# Patient Record
Sex: MALE | Race: WHITE
[De-identification: names, ages, dates, MRNs, and addresses within clinical notes are randomized per-mention and may not be internally consistent; named-entity substitution may affect disease eponyms.]

---

## 2017-04-24 ENCOUNTER — HOSPITAL ENCOUNTER (OUTPATIENT)
Dept: HOSPITAL 45 - C.LABPBG | Age: 72
Discharge: HOME | End: 2017-04-24
Attending: INTERNAL MEDICINE
Payer: COMMERCIAL

## 2017-04-24 DIAGNOSIS — E83.52: ICD-10-CM

## 2017-04-24 DIAGNOSIS — E11.9: Primary | ICD-10-CM

## 2017-04-24 LAB — EST. AVERAGE GLUCOSE BLD GHB EST-MCNC: 197 MG/DL

## 2017-04-28 ENCOUNTER — HOSPITAL ENCOUNTER (OUTPATIENT)
Dept: HOSPITAL 45 - C.RADBC | Age: 72
Discharge: HOME | End: 2017-04-28
Attending: PHYSICIAN ASSISTANT
Payer: COMMERCIAL

## 2017-04-28 DIAGNOSIS — M25.551: Primary | ICD-10-CM

## 2017-04-28 NOTE — DIAGNOSTIC IMAGING REPORT
SI JOINTS 3 OR MORE VIEWS



CLINICAL HISTORY: Right hip and lower back pain    



COMPARISON STUDY:  No previous studies for comparison.



FINDINGS: No fractures are visualized. There is no SI joint fusion. There are no

erosive changes.



IMPRESSION:  Age-related change. No evidence of fracture. No evidence of erosive

disease. 









Electronically signed by:  Zane Mejia M.D.

4/28/2017 12:55 PM



Dictated Date/Time:  4/28/2017 12:54 PM

## 2017-04-28 NOTE — DIAGNOSTIC IMAGING REPORT
RIGHT HIP 2 VIEWS



HISTORY:      Hip pain, right

Right



COMPARISON: None.



FINDINGS: There is no fracture or dislocation. Soft tissues are unremarkable.

The visualized pelvic bones are intact. Vascular calcifications are noted. Mild

cartilage space narrowing and marginal osteophytes within the right hip. There

is also subchondral sclerosis within the superior aspect of the right hip.

Findings are consistent with degenerative change.



IMPRESSION:  

No fractures. Mild osteoarthritis within the right hip.







Electronically signed by:  Sukhwinder Linder M.D.

4/28/2017 12:54 PM



Dictated Date/Time:  4/28/2017 12:53 PM

## 2017-06-12 ENCOUNTER — HOSPITAL ENCOUNTER (OUTPATIENT)
Dept: HOSPITAL 45 - C.LABPBG | Age: 72
Discharge: HOME | End: 2017-06-12
Attending: INTERNAL MEDICINE
Payer: COMMERCIAL

## 2017-06-12 DIAGNOSIS — E55.9: ICD-10-CM

## 2017-06-12 DIAGNOSIS — E11.9: ICD-10-CM

## 2017-06-12 DIAGNOSIS — I10: Primary | ICD-10-CM

## 2017-06-12 DIAGNOSIS — M19.90: ICD-10-CM

## 2017-06-12 DIAGNOSIS — D64.9: ICD-10-CM

## 2017-06-12 DIAGNOSIS — E83.52: ICD-10-CM

## 2017-06-12 LAB
ANION GAP SERPL CALC-SCNC: 9 MMOL/L (ref 3–11)
BASOPHILS # BLD: 0.02 K/UL (ref 0–0.2)
BASOPHILS NFR BLD: 0.3 %
BUN SERPL-MCNC: 17 MG/DL (ref 7–18)
BUN/CREAT SERPL: 14.5 (ref 10–20)
CALCIUM SERPL-MCNC: 9.6 MG/DL (ref 8.5–10.1)
CHLORIDE SERPL-SCNC: 111 MMOL/L (ref 98–107)
CO2 SERPL-SCNC: 25 MMOL/L (ref 21–32)
COMPLETE: YES
CREAT SERPL-MCNC: 1.2 MG/DL (ref 0.6–1.4)
EOSINOPHIL NFR BLD AUTO: 163 K/UL (ref 130–400)
GLUCOSE SERPL-MCNC: 138 MG/DL (ref 70–99)
HCT VFR BLD CALC: 40.1 % (ref 42–52)
IG%: 0.2 %
IMM GRANULOCYTES NFR BLD AUTO: 39.9 %
LYMPHOCYTES # BLD: 2.59 K/UL (ref 1.2–3.4)
MCH RBC QN AUTO: 31.1 PG (ref 25–34)
MCHC RBC AUTO-ENTMCNC: 33.2 G/DL (ref 32–36)
MCV RBC AUTO: 93.9 FL (ref 80–100)
MONOCYTES NFR BLD: 7.7 %
NEUTROPHILS # BLD AUTO: 2.6 %
NEUTROPHILS NFR BLD AUTO: 49.3 %
PMV BLD AUTO: 10.6 FL (ref 7.4–10.4)
POTASSIUM SERPL-SCNC: 4.2 MMOL/L (ref 3.5–5.1)
RBC # BLD AUTO: 4.27 M/UL (ref 4.7–6.1)
SODIUM SERPL-SCNC: 145 MMOL/L (ref 136–145)
WBC # BLD AUTO: 6.49 K/UL (ref 4.8–10.8)

## 2017-06-13 LAB — EST. AVERAGE GLUCOSE BLD GHB EST-MCNC: 169 MG/DL

## 2017-10-09 ENCOUNTER — HOSPITAL ENCOUNTER (INPATIENT)
Dept: HOSPITAL 45 - C.EDB | Age: 72
LOS: 1 days | Discharge: HOME | DRG: 66 | End: 2017-10-10
Attending: HOSPITALIST | Admitting: HOSPITALIST
Payer: COMMERCIAL

## 2017-10-09 VITALS
HEART RATE: 57 BPM | OXYGEN SATURATION: 94 % | SYSTOLIC BLOOD PRESSURE: 174 MMHG | DIASTOLIC BLOOD PRESSURE: 83 MMHG | TEMPERATURE: 98.06 F

## 2017-10-09 VITALS
HEART RATE: 62 BPM | SYSTOLIC BLOOD PRESSURE: 147 MMHG | OXYGEN SATURATION: 95 % | DIASTOLIC BLOOD PRESSURE: 71 MMHG | TEMPERATURE: 97.88 F

## 2017-10-09 VITALS
WEIGHT: 196.21 LBS | HEIGHT: 69.02 IN | BODY MASS INDEX: 29.06 KG/M2 | WEIGHT: 196.21 LBS | BODY MASS INDEX: 29.06 KG/M2 | HEIGHT: 69.02 IN | BODY MASS INDEX: 29.06 KG/M2

## 2017-10-09 VITALS
HEART RATE: 61 BPM | SYSTOLIC BLOOD PRESSURE: 128 MMHG | TEMPERATURE: 97.7 F | DIASTOLIC BLOOD PRESSURE: 66 MMHG | OXYGEN SATURATION: 91 %

## 2017-10-09 VITALS — OXYGEN SATURATION: 99 %

## 2017-10-09 DIAGNOSIS — Z79.82: ICD-10-CM

## 2017-10-09 DIAGNOSIS — E83.42: ICD-10-CM

## 2017-10-09 DIAGNOSIS — F17.220: ICD-10-CM

## 2017-10-09 DIAGNOSIS — E78.5: ICD-10-CM

## 2017-10-09 DIAGNOSIS — I63.412: Primary | ICD-10-CM

## 2017-10-09 DIAGNOSIS — I10: ICD-10-CM

## 2017-10-09 DIAGNOSIS — E11.9: ICD-10-CM

## 2017-10-09 DIAGNOSIS — Z86.73: ICD-10-CM

## 2017-10-09 DIAGNOSIS — M19.90: ICD-10-CM

## 2017-10-09 DIAGNOSIS — Z96.651: ICD-10-CM

## 2017-10-09 LAB
ALP SERPL-CCNC: 86 U/L (ref 45–117)
ALT SERPL-CCNC: 31 U/L (ref 12–78)
ANION GAP SERPL CALC-SCNC: 9 MMOL/L (ref 3–11)
APPEARANCE UR: CLEAR
AST SERPL-CCNC: 34 U/L (ref 15–37)
BASOPHILS # BLD: 0.03 K/UL (ref 0–0.2)
BASOPHILS NFR BLD: 0.6 %
BILIRUB UR-MCNC: (no result) MG/DL
BUN SERPL-MCNC: 13 MG/DL (ref 7–18)
BUN/CREAT SERPL: 12.9 (ref 10–20)
CALCIUM SERPL-MCNC: 9.7 MG/DL (ref 8.5–10.1)
CHLORIDE SERPL-SCNC: 110 MMOL/L (ref 98–107)
CKMB/CK RATIO: 0.7 (ref 0–3)
CO2 SERPL-SCNC: 23 MMOL/L (ref 21–32)
COLOR UR: YELLOW
COMPLETE: YES
CREAT CL PREDICTED SERPL C-G-VRATE: 75.6 ML/MIN
CREAT SERPL-MCNC: 1 MG/DL (ref 0.6–1.4)
EOSINOPHIL NFR BLD AUTO: 156 K/UL (ref 130–400)
EST. AVERAGE GLUCOSE BLD GHB EST-MCNC: 240 MG/DL
GLUCOSE SERPL-MCNC: 194 MG/DL (ref 70–99)
HCT VFR BLD CALC: 41.7 % (ref 42–52)
IG%: 0.4 %
IMM GRANULOCYTES NFR BLD AUTO: 40 %
INR PPP: 1 (ref 0.9–1.1)
LYMPHOCYTES # BLD: 2.16 K/UL (ref 1.2–3.4)
MAGNESIUM SERPL-MCNC: 1.5 MG/DL (ref 1.8–2.4)
MANUAL MICROSCOPIC REQUIRED?: NO
MCH RBC QN AUTO: 30.5 PG (ref 25–34)
MCHC RBC AUTO-ENTMCNC: 33.1 G/DL (ref 32–36)
MCV RBC AUTO: 92.3 FL (ref 80–100)
MONOCYTES NFR BLD: 7.2 %
NEUTROPHILS # BLD AUTO: 2.4 %
NEUTROPHILS NFR BLD AUTO: 49.4 %
NITRITE UR QL STRIP: (no result)
PARTIAL THROMBOPLASTIN RATIO: 1
PH UR STRIP: 5 [PH] (ref 4.5–7.5)
PMV BLD AUTO: 10.3 FL (ref 7.4–10.4)
POTASSIUM SERPL-SCNC: 3.8 MMOL/L (ref 3.5–5.1)
PROTHROMBIN TIME: 10.7 SECONDS (ref 9–12)
RBC # BLD AUTO: 4.52 M/UL (ref 4.7–6.1)
REVIEW REQ?: YES
SODIUM SERPL-SCNC: 142 MMOL/L (ref 136–145)
SP GR UR STRIP: 1.03 (ref 1–1.03)
TSH SERPL-ACNC: 2.52 UIU/ML (ref 0.3–4.5)
URINE EPITHELIAL CELL AUTO: (no result) /LPF (ref 0–5)
UROBILINOGEN UR-MCNC: (no result) MG/DL
WBC # BLD AUTO: 5.4 K/UL (ref 4.8–10.8)
ZZUR CULT IF INDIC CLEAN CATCH: NO

## 2017-10-09 RX ADMIN — MAGNESIUM SULFATE IN DEXTROSE SCH MLS/HR: 10 INJECTION, SOLUTION INTRAVENOUS at 17:20

## 2017-10-09 RX ADMIN — MAGNESIUM SULFATE IN DEXTROSE SCH MLS/HR: 10 INJECTION, SOLUTION INTRAVENOUS at 16:11

## 2017-10-09 RX ADMIN — MAGNESIUM SULFATE IN DEXTROSE SCH MLS/HR: 10 INJECTION, SOLUTION INTRAVENOUS at 15:14

## 2017-10-09 RX ADMIN — INSULIN ASPART SCH UNITS: 100 INJECTION, SOLUTION INTRAVENOUS; SUBCUTANEOUS at 17:02

## 2017-10-09 RX ADMIN — INSULIN ASPART SCH UNITS: 100 INJECTION, SOLUTION INTRAVENOUS; SUBCUTANEOUS at 20:34

## 2017-10-09 RX ADMIN — INSULIN DETEMIR SCH UNITS: 100 INJECTION, SOLUTION SUBCUTANEOUS at 20:35

## 2017-10-09 RX ADMIN — INSULIN ASPART SCH UNITS: 100 INJECTION, SOLUTION INTRAVENOUS; SUBCUTANEOUS at 11:00

## 2017-10-09 NOTE — DIAGNOSTIC IMAGING REPORT
SINGLE VIEW CHEST



CLINICAL HISTORY:  Weakness. Change in mental status.



FINDINGS: An AP, portable, upright chest radiograph is compared to study dated

11/12/2015. The cardiomediastinal silhouette is unremarkable. There is

atherosclerotic calcification of the thoracic aorta. There is mild elevation of

left hemidiaphragm with left basilar atelectasis. No airspace consolidation is

seen typical for pneumonia and there is no large pleural effusion. No

pneumothorax is seen. The bony thorax is grossly intact.



IMPRESSION: No acute cardiopulmonary abnormality.







Electronically signed by:  Jason Clemons M.D.

10/9/2017 8:22 AM



Dictated Date/Time:  10/9/2017 8:21 AM

## 2017-10-09 NOTE — DIAGNOSTIC IMAGING REPORT
MRI OF THE BRAIN WITHOUT  CONTRAST



CLINICAL HISTORY: Transient ischemic attack. RIGHT ARM APRAXIA.



COMPARISON STUDY: Noncontrast head CT dated 10/9/2017



FINDINGS:

Sagittal T1, axial diffusion, proton density and T2 weighted axial, coronal

FLAIR, and axial T1-weighted images were acquired. 

No intra or extra-axial mass lesions are visualized

There are very small foci of restricted water diffusion within the left frontal

lobe, parietal lobe, and temporal lobes. The findings are consistent with small

foci of acute/subacute infarction.

There is no evidence of ventricular dilatation.

Proton density T2-weighted and FLAIR images reveal scattered foci of increased

T2 signal within the white matter, likely on a small vessel basis. There is an

old left cerebellar infarct. There are atrophic changes with prominent

subarachnoid space.

There are no abnormal flow voids.



IMPRESSION:  Scattered very small foci of restricted water diffusion within the

distribution of left middle cerebral artery, consistent with foci of

acute/subacute infarction







Electronically signed by:  Zane Mejia M.D.

10/9/2017 1:37 PM



Dictated Date/Time:  10/9/2017 1:33 PM

## 2017-10-09 NOTE — HISTORY AND PHYSICAL
History & Physical


Date & Time of Service:


Oct 9, 2017 at 09:09


Chief Complaint:


No Movement In Right Hand


Primary Care Physician:


Vishnu Hastings M.D.


History of Present Illness


Mr. Neumann noticed at 5:30 this morning that he was experiencing right hand 

weakness when using his remote control. He also had associated right facial 

droop.  He did not have any aphasia or slurring of speech or other symptoms and 

his wife did not notice that he was different. He took a baby aspirin at home. 

Symptoms resolved after getting to the emergency department at about 0800.  He 

has never had a stroke or TIA in the past and has no cardiac history.





Hx  DMII with insulin, chews tobacco x 60 years, htn, hyperlipidemia, siatica 

with 3 injections in the past 2 months, right knee arthroplasty , Baker's 

cyst right leg.





Past Medical/Surgical History


Medical Problems:


(1) Diabetes


Status: Chronic  





(2) HTN (hypertension)


Status: Chronic  





(3) Hyperlipemia


Status: Chronic  





Surgical Problems:


(1) H/O: knee surgery


Status: Resolved  











Family History





Patient reports no known family medical history.


Mother  of Alzheimers


Father  of old age


Brother with no significant medical history


Two daughters and a son - one daughter and one son are diabetic Type II


Granddaughter with Type I diabetes.





Social History


Smoking Status:  Never Smoker


Smokeless Tobacco Use:  Yes


Alcohol Use:  socially


Drug Use:  none


Marital Status:  


Housing status:  lives with significant other


Occupational Status:  retired





Immunizations


History of Influenza Vaccine:  Yes


Influenza Vaccine Date:  Oct 18, 2006


History of Tetanus Vaccine?:  Yes


History of Pneumococcal:  Yes


Pneumococcal Date:  Oct 18, 2006


History of Hepatitis B Vaccine:  No





Multi-Drug Resistant Organisms


History of MDRO:  No





Allergies


Coded Allergies:  


     No Known Allergies (Verified , 10/9/17)





Home Medications


Scheduled


Amlodipine (Norvasc), 5 MG PO DAILY


Aspirin (Aspirin Ec), 81 MG PO DAILY


Atorvastatin (Lipitor), 40 MG PO DAILY


Cholecalciferol (Vitamin D-3), 2,000 UNITS PO DAILY


Cyanocobalamin (Vitamin B-12), 1,000 MCG PO DAILY


Insulin Detemir (Levemir), 50 UNITS SC BID


Losartan Potassium (Cozaar), 100 MG PO DAILY


Metformin Hcl (Glucophage), 500 MG PO QAM


Metformin Hcl (Glucophage), 1,000 MG PO QPM


Multivitamin (Multivitamin), 1 TAB PO DAILY


Turmeric (Curcuma Longa) (Turmeric), 500 MG PO DAILY





Review of Systems


Constitutional:  No chills, No sweats


Eyes:  No worsening of vision


Respiratory:  No shortness of breath


Cardiovascular:  No chest pain


Abdomen:  No nausea, No vomiting, No diarrhea, No constipation


Neurologic:  + weakness, + numbness/tingling





Physical Exam


Vital Signs











  Date Time  Temp Pulse Resp B/P (MAP) Pulse Ox O2 Delivery O2 Flow Rate FiO2


 


10/9/17 09:07  62 20 146/82 99 Room Air  


 


10/9/17 08:02  59      


 


10/9/17 07:43 36.6 77 20 166/96 97 Room Air  








General: no distress


Eyes: normal inspection, PERLL


Respiratory: chest non tender, clear to auscultation, normal breath sounds, no 

respiratory distress, no accessory muscle use


Cardiac: regular rate and rhythm, no rub or gallop, no murmur, no edema, no jvd


GI/: active bowel sounds, no abd pain or tenderness, soft, non distended


Extremities: normal range of motion, normal strength, non tender 


Neuro/Psych: alert and oriented x 3, normal mood and affect


Skin: normal color, dry





Diagnostics


Laboratory Results





Results Past 24 Hours








Test


  10/9/17


08:00 Range/Units


 


 


White Blood Count 5.40 4.8-10.8  K/uL


 


Red Blood Count 4.52 4.7-6.1  M/uL


 


Hemoglobin 13.8 14.0-18.0  g/dL


 


Hematocrit 41.7 42-52  %


 


Mean Corpuscular Volume 92.3   fL


 


Mean Corpuscular Hemoglobin 30.5 25-34  pg


 


Mean Corpuscular Hemoglobin


Concent 33.1


  32-36  g/dl


 


 


Platelet Count 156 130-400  K/uL


 


Mean Platelet Volume 10.3 7.4-10.4  fL


 


Neutrophils (%) (Auto) 49.4  %


 


Lymphocytes (%) (Auto) 40.0  %


 


Monocytes (%) (Auto) 7.2  %


 


Eosinophils (%) (Auto) 2.4  %


 


Basophils (%) (Auto) 0.6  %


 


Neutrophils # (Auto) 2.67 1.4-6.5  K/uL


 


Lymphocytes # (Auto) 2.16 1.2-3.4  K/uL


 


Monocytes # (Auto) 0.39 0.11-0.59  K/uL


 


Eosinophils # (Auto) 0.13 0-0.5  K/uL


 


Basophils # (Auto) 0.03 0-0.2  K/uL


 


RDW Standard Deviation 43.3 36.4-46.3  fL


 


RDW Coefficient of Variation 12.9 11.5-14.5  %


 


Immature Granulocyte % (Auto) 0.4  %


 


Immature Granulocyte # (Auto) 0.02 0.00-0.02  K/uL


 


Prothrombin Time


  10.7


  9.0-12.0


SECONDS


 


Prothromb Time International


Ratio 1.0


  0.9-1.1  


 


 


Activated Partial


Thromboplast Time 25.0


  21.0-31.0


SECONDS


 


Partial Thromboplastin Ratio 1.0  


 


Urine Color YELLOW  


 


Urine Appearance CLEAR CLEAR  


 


Urine pH 5.0 4.5-7.5  


 


Urine Specific Gravity 1.030 1.000-1.030  


 


Urine Protein NEG NEG  


 


Urine Glucose (UA) 2+ NEG  


 


Urine Ketones TRACE NEG  


 


Urine Occult Blood NEG NEG  


 


Urine Nitrite NEG NEG  


 


Urine Bilirubin NEG NEG  


 


Urine Urobilinogen NEG NEG  


 


Urine Leukocyte Esterase NEG NEG  


 


Urine WBC (Auto) 1-5 0-5  /hpf


 


Urine RBC (Auto) 0-4 0-4  /hpf


 


Urine Hyaline Casts (Auto) 0 0-5  /lpf


 


Urine Epithelial Cells (Auto) 0-5 0-5  /lpf


 


Urine Bacteria (Auto) NEG NEG  


 


Urine Crystals CALCIUM OXALATE NONE PRSENT  


 


Sodium Level 142 136-145  mmol/L


 


Potassium Level 3.8 3.5-5.1  mmol/L


 


Chloride Level 110   mmol/L


 


Carbon Dioxide Level 23 21-32  mmol/L


 


Anion Gap 9.0 3-11  mmol/L


 


Blood Urea Nitrogen 13 7-18  mg/dl


 


Creatinine


  1.00


  0.60-1.40


mg/dl


 


Est Creatinine Clear Calc


Drug Dose 75.6


   ml/min


 


 


Estimated GFR (


American) 87.4


   


 


 


Estimated GFR (Non-


American 75.4


   


 


 


BUN/Creatinine Ratio 12.9 10-20  


 


Random Glucose 194 70-99  mg/dl


 


Calcium Level 9.7 8.5-10.1  mg/dl


 


Magnesium Level 1.5 1.8-2.4  mg/dl


 


Total Bilirubin 0.8 0.2-1  mg/dl


 


Direct Bilirubin 0.2 0-0.2  mg/dl


 


Aspartate Amino Transf


(AST/SGOT) 34


  15-37  U/L


 


 


Alanine Aminotransferase


(ALT/SGPT) 31


  12-78  U/L


 


 


Alkaline Phosphatase 86   U/L


 


Total Creatine Kinase 86   U/L


 


Creatine Kinase MB 0.6 0.5-3.6  ng/ml


 


Creatine Kinase MB Ratio 0.7 0-3.0  


 


Troponin I < 0.015 0-0.045  ng/ml


 


Total Protein 7.4 6.4-8.2  gm/dl


 


Albumin 4.0 3.4-5.0  gm/dl


 


Lipase 524   U/L


 


Thyroid Stimulating Hormone


(TSH) 2.520


  0.300-4.500


uIu/ml











Diagnostic Radiology


SINGLE VIEW CHEST





CLINICAL HISTORY:  Weakness. Change in mental status.





FINDINGS: An AP, portable, upright chest radiograph is compared to study dated


2015. The cardiomediastinal silhouette is unremarkable. There is


atherosclerotic calcification of the thoracic aorta. There is mild elevation of


left hemidiaphragm with left basilar atelectasis. No airspace consolidation is


seen typical for pneumonia and there is no large pleural effusion. No


pneumothorax is seen. The bony thorax is grossly intact.





IMPRESSION: No acute cardiopulmonary abnormality.








CT SCAN OF THE BRAIN WITHOUT IV CONTRAST





CLINICAL HISTORY: Weakness.  Change in mental status.





COMPARISON STUDY:  No priors.





TECHNIQUE: Unenhanced axial CT scan of the brain is performed from the vertex to


the skull base.





CT DOSE: 537.48 mGy.cm





FINDINGS:





Brain parenchyma: There are age-related involutional changes noting  mild


subcortical and periventricular microangiopathic change. A small focus of left


cerebellar encephalomalacia is consistent with a remote infarct. An age


indeterminant lacunar infarct is also seen in the left basal ganglia. There is


no hemorrhage, mass effect, or evidence of acute territorial ischemia by CT


criteria. Gray-white matter is preserved. No extra-axial fluid collection is


seen.





Ventricles, sulci, cisterns: Prominent secondary to involutional change.





Intracranial vasculature: There is atherosclerotic calcification of the


cavernous carotid arteries.





Calvarium: Unremarkable.





Sinuses and mastoids: The visualized paranasal sinuses are clear. The mastoid


air cells are well pneumatized.





Orbits: The bony orbits are grossly intact.





IMPRESSION: There is no hemorrhage, mass effect, or evidence of acute


territorial ischemia by CT criteria.





Impression


Assessment and Plan


Mr. Neumann is a 71 year old man here for TIA.  His symptoms which included right 

hand weakness and right facial droop lasted about two hours and have resolved 

at this point. CT of the head showed a remote left cerebellar infarct and left 

age indeterminate basal ganglia lacunar infarct. 





TIA


- admit observation to the telemetry unit


- A1c, lipids, troponin, prp and cbc in the morning


- MRI head, carotid doppler, echocardiogram


-  continue ASA


- AHA diet





HTN 


- home htn meds held for now. 


- hydralazine prn elevated bp





DM


- BSG AC & HS


- Levemir and ss





Hypomagnesemia


- Mag 1.5 - 3 gm IV to replace





Osteoarthritis


- Tylenol prn





Hyperlipidemia


- continue statin





DVT prophylaxis - enoxaprin and SCDs


Resuscitation status - full resuscitation





NP Physician Supervision Note:





I interviewed and examined the patient. Discussed with Marga Hastings NP and 

agree with findings and plan as documented in the note. Any exceptions or 

clarifications are listed here: None





Patient presented with transient neurological symptoms found to have fairly 

poorly controlled diabetes and a new evidence of likely embolic stroke.





Vital signs are stable





No evidence of recurrent neurological dysfunction will continue with secondary 

stroke prevention neurologic consult.  It escalated his aspirin to full dose 

continuing high potency statin, no significant carotid disease or embolic 

source seen within heart although intra-arterial shunt could not be determined 

on transthoracic echo





After PT OT evaluation the patient may return home with close outpatient follow-

up








Documented By:  Shaw Pierce





Advanced Directives


Existing Advance Directive:  No


Existing Living Will:  No


Existing Power of :  No


Existing Health Care Proxy:  No





Resuscitation Status


FULL RESUSCITATION (does not want life support if there is no chance of 

meaningful recovery)





VTE Prophylaxis


Risk Level:  Moderate


Given or contraindicated:  Enoxaparin (Lovenox)SQ





Social Service Consult


None Apply

## 2017-10-09 NOTE — DIAGNOSTIC IMAGING REPORT
MR ANGIOGRAM OF THE BRAIN



CLINICAL HISTORY:  Stroke.



COMPARISON STUDY: MRI of the brain performed concurrently on 10/9/2017.



TECHNIQUE: 3-D time-of-flight MR angiography of the intracranial circulation is

performed. 3-D tumble views are created and assessed. IV contrast was not

administered for this examination. The examination is degraded by motion

artifact.



FINDINGS: The internal carotid arteries are widely patent bilaterally, as are

the anterior and middle cerebral arteries. The vertebrobasilar system and

posterior cerebral arteries are widely patent. The  right vertebral artery is

dominant. There is no aneurysm, high-grade stenosis, or focal vessel cutoff seen

throughout the intracranial circulation. A chronic infarct is seen in the left

cerebellar hemisphere.



IMPRESSION: Unremarkable MR angiogram of the brain. 







Electronically signed by:  Jason Clemons M.D.

10/9/2017 1:38 PM



Dictated Date/Time:  10/9/2017 1:36 PM

## 2017-10-09 NOTE — DIAGNOSTIC IMAGING REPORT
ULTRASOUND OF THE CAROTID ARTERIES



CLINICAL HISTORY: Transient ischemic attack    



COMPARISON STUDY: None.



TECHNIQUE: Real-time, grayscale, and color Doppler sonography of the carotid

arteries was performed. Imaging reviewed in the transverse and longitudinal

planes. NASCET criteria was utilized for stenosis calcification.



FINDINGS:



There is mild to moderate atherosclerotic plaque present    . 

The peak systolic velocity within the right internal carotid artery is 75

cm/sec.

The systolic velocity ratio of right internal to common carotid artery is 1.1.

The peak systolic velocity within the left internal carotid artery is 63 cm/sec.

The systolic velocity ratio left internal to common carotid artery is 1.1.



Antegrade flow is seen in the vertebral arteries. The external carotid arteries

are patent.



.



IMPRESSION: No evidence of hemodynamically significant carotid stenosis.







Electronically signed by:  Zane Mejia M.D.

10/9/2017 11:31 AM



Dictated Date/Time:  10/9/2017 11:29 AM

## 2017-10-09 NOTE — DIAGNOSTIC IMAGING REPORT
CT SCAN OF THE BRAIN WITHOUT IV CONTRAST



CLINICAL HISTORY: Weakness.  Change in mental status.



COMPARISON STUDY:  No priors.



TECHNIQUE: Unenhanced axial CT scan of the brain is performed from the vertex to

the skull base.



CT DOSE: 537.48 mGy.cm



FINDINGS:



Brain parenchyma: There are age-related involutional changes noting  mild

subcortical and periventricular microangiopathic change. A small focus of left

cerebellar encephalomalacia is consistent with a remote infarct. An age

indeterminant lacunar infarct is also seen in the left basal ganglia. There is

no hemorrhage, mass effect, or evidence of acute territorial ischemia by CT

criteria. Gray-white matter is preserved. No extra-axial fluid collection is

seen.



Ventricles, sulci, cisterns: Prominent secondary to involutional change.



Intracranial vasculature: There is atherosclerotic calcification of the

cavernous carotid arteries.



Calvarium: Unremarkable.



Sinuses and mastoids: The visualized paranasal sinuses are clear. The mastoid

air cells are well pneumatized.



Orbits: The bony orbits are grossly intact.





IMPRESSION: There is no hemorrhage, mass effect, or evidence of acute

territorial ischemia by CT criteria.







Electronically signed by:  Jason Clemons M.D.

10/9/2017 8:16 AM



Dictated Date/Time:  10/9/2017 8:12 AM

## 2017-10-09 NOTE — ECHOCARDIOGRAM REPORT
*NOTICE TO RECEIVING PARTY AGENCY**  This information is strictly Confidential and 
protected under [place] law.  [place] law prohibits you from making any further 
disclosure of this information unless further disclosure is expressly permitted by the 
written consent of the person to whom it pertains or is authorized by law.  A general 
authorization for the release of medical or other information is not sufficient for this 
purpose.  Hospital accepts no responsibility if the information is made available to any 
other person, INCLUDING THE PATIENT.



Interpretation Summary

  *  Name: YOCASTA BERRY  Study Date: 10/09/2017 10:15 AM  BP: 152/74 mmHg

  *  MRN: [id number]  Patient Location: [facility]  HR: 62

  *  : 1945 (M/d/yyyy)  Gender: Male  Height: 69 in

  *  Age: 71 yrs  Ethnicity: [place]  Weight: 200 lb

  *  Ordering Physician: Sherri Hastings

  *  Performed By: Kamryn Matthew

  *  Accession# [id number]  Account# [id number]

  *  Reason For Study: CEREBRAL ISCHEMIA/ EMBOLUS

  *  BSA: 2.1 m2

  *  -- Conclusions --

  *  1. Normal LV size, normal LV wall thickness.

  *  2. Normal LV function.  LVEF 55-60%.  Abnormal septal motion consistent with RV 
volume overload.

  *  3. Mildly dilated RV, normal RV function.

  *  4. Mild aortic regurgitation.

  *  5. Technically difficult saline contrast study - unable to rule out interatrial 
shunt.

  *  6. Mildly dilated ascending aorta (4.6 cm).

  *  7. No prior studies for comparison.

Procedure Details

  *  A complete two-dimensional transthoracic echocardiogram was performed (2D, M-mode, 
Doppler and color flow Doppler).

  *  A saline contrast injection was performed to assess for cardiac shunting.

  *  The injection was performed through an intravenous line in the left arm.

  *  The attending nurse who injected the saline contrast was CLAUDIA ANGLIN RN.

  *  A total of 20 cc of agitated saline was given.

  *  A contrast injection of Definity was performed to improve assessment of LV function.

  *  Contrast was injected into an intravenous site in the left arm.

  *  One vial of Definity ultrasound contrast was diluted in normal saline to a total 
volume of 10 ml.  A total of '3' ml of solution was administered during imaging.

  *  Lot # [id number] of Definity utilized for procedure.

  *  Expiration date 10/18.

  *  The attending nurse who injected the contrast agent was CLAUDIA ANGLIN RN.

Left Ventricle

  *  The left ventricle is grossly normal size.

  *  There is normal left ventricular wall thickness.

  *  Ejection Fraction = 55-60%.

  *  Paradoxical septal motion is consistent with right ventricular volume overload.

Right Ventricle

  *  The right ventricle is mildly dilated.

  *  The right ventricular systolic function is normal as assessed by tricuspid annular 
plane systolic excursion (TAPSE) (normal >1.5 cm).

Atria

  *  The left atrial size is normal.

  *  The right atrium is moderately dilated.

  *  There is no evidence of atrial septal defect, but resolution does not allow 
assessment for a patent foramen ovale.

  *  Saline contrast study performed - unable to rule out interatrial shunt.

Mitral Valve

  *  The mitral valve is grossly normal.

  *  There is no mitral valve stenosis.

  *  There is trace mitral regurgitation.

Tricuspid Valve

  *  The tricuspid valve is not well visualized.

  *  Significant tricuspid regurgitation is absent.

Aortic Valve

  *  The aortic valve opens well.

  *  No hemodynamically significant valvular aortic stenosis.

  *  Mild aortic regurgitation.

Pulmonic Valve

  *  The pulmonary valve is inadequately visualized, but the Doppler data is adequate for 
interpretation.

  *  Pulmonic stenosis is absent.

Great Vessels

  *  Mildly dilated ascending aorta.

  *  Ascending aorta 4.6 cm

Pericardium/Pleural

  *  There is no pericardial effusion.

Great Vessels

  *  IVC > 2.1, >50% change with respiration.



MMode 2D Measurements and Calculations

IVSd 1.2 cm

IVSs 1.6 cm



LVIDd 5.0 cm

LVIDs 3.5 cm

LVPWd 1.0 cm

LVPWs 1.8 cm



IVS/LVPW 1.1 

FS 31.3 %

EDV(Teich) 120.8 ml

ESV(Teich) 49.8 ml

EF(Teich) 58.8 %



EDV(cubed) 128.5 ml

ESV(cubed) 41.8 ml

EF(cubed) 67.5 %

% IVS thick 36.2 %

% LVPW thick 77.4 %





LV mass(C)d 208.3 grams

LV mass(C)dI 100.8 grams/m\S\2

LV mass(C)s 235.8 grams

LV mass(C)sI 114.1 grams/m\S\2



CO(Teich) 3.8 l/min

CI(Teich) 1.9 l/min/m\S\2

SV(Teich) 71.0 ml

SI(Teich) 34.4 ml/m\S\2

CO(cubed) 4.7 l/min

CI(cubed) 2.3 l/min/m\S\2

SV(cubed) 86.8 ml

SI(cubed) 42.0 ml/m\S\2



ACS 1.6 cm



asc Aorta Diam 4.5 cm





LVOT diam 2.0 cm

LVOT area 3.0 cm\S\2



LVAd ap4 34.0 cm\S\2

LVLd ap4 8.1 cm

EDV(MOD-sp4) 117.0 ml

LVAs ap4 18.8 cm\S\2

LVLs ap4 6.4 cm

ESV(MOD-sp4) 45.4 ml

EF(MOD-sp4) 61.2 %



LVAd ap2 24.1 cm\S\2

LVLd ap2 7.2 cm

EDV(MOD-sp2) 66.4 ml

LVAs ap2 14.1 cm\S\2

LVLs ap2 6.4 cm

ESV(MOD-sp2) 26.0 ml

EF(MOD-sp2) 60.8 %



CO(MOD-sp4) 3.9 l/min

CI(MOD-sp4) 1.9 l/min/m\S\2

SV(MOD-sp4) 71.6 ml

SI(MOD-sp4) 34.7 ml/m\S\2





CO(MOD-sp2) 2.2 l/min

CI(MOD-sp2) 1.1 l/min/m\S\2

SV(MOD-sp2) 40.4 ml

SI(MOD-sp2) 19.6 ml/m\S\2













Doppler Measurements and Calculations

MV E max eric 97.5 cm/sec

MV A max eric 95.1 cm/sec



MV E/A 1.0 



MV dec time 0.25 sec



Ao V2 max 122.2 cm/sec

Ao max PG 6.0 mmHg

Ao max PG (full) 1.3 mmHg

DIMA(V,A) 2.7 cm\S\2

DIMA(V,D) 2.7 cm\S\2





LV V1 max PG 4.6 mmHg



LV V1 max 107.7 cm/sec



MR max eric 350.2 cm/sec

MR max PG 49.1 mmHg



PA V2 max 75.2 cm/sec

PA max PG 2.3 mmHg

## 2017-10-09 NOTE — EMERGENCY ROOM VISIT NOTE
History


Report prepared by Amparo:  Braulio Solis


Under the Supervision of:  Dr. Vince Carballo D.O.


First contact with patient:  07:54


Chief Complaint:  STROKE SYMPTOMS


Stated Complaint:  NO MOVEMENT IN RIGHT HAND


Nursing Triage Summary:  


patient states about 0600 today he woke up and was unable to  things with 


his right hand.  I think the right side of my face was drooping as well. 


symptoms lasted about 30 mins. my right hand still feels numb and weak.





History of Present Illness


The patient is a 71 year old male who presents to the Emergency Room with 

complaints of improving right hand neurologic symptoms beginning 2.5 hours ago. 

The patient's symptoms include weakness and numbness. He states that he was 

unable to move his fingers, and was unable to operate the TV remote. He notes 

that his entire arm felt abnormal as well. The patient states that he woke up 

three hours ago, and noticed his symptoms about a half hour later. He also 

complains of resolved mild right sided facial droop and problems with balance. 

He denies any visual changes, confusion, or slurred speech. The patient is on 

baby aspirin, but denies any other blood thinner use.





   Source of History:  patient


   Onset:  2.5 hours ago


   Position:  hand (right)


   Quality:  numbness, other (weakness)


   Timing:  other (improving)


Note:


The patient also complains of resolved mild right sided facial droop and 

problems with balance. He denies any visual changes, confusion, or slurred 

speech.





Review of Systems


See HPI for pertinent positives & negatives. A total of 10 systems reviewed and 

were otherwise negative.





Past Medical & Surgical


Medical Problems:


(1) Diabetes


(2) HTN (hypertension)


(3) Hyperlipemia


(4) TIA (transient ischemic attack)


Surgical Problems:


(1) H/O: knee surgery








Family History





Patient reports no known family medical history.





Social History


Smoking Status:  Never Smoker


Marital Status:  


Housing Status:  lives with family


Occupation Status:  retired





Current/Historical Medications


Scheduled


Amlodipine (Norvasc), 5 MG PO DAILY


Aspirin (Aspirin Ec), 81 MG PO DAILY


Atorvastatin (Lipitor), 40 MG PO DAILY


Cholecalciferol (Vitamin D-3), 2,000 UNITS PO DAILY


Cyanocobalamin (Vitamin B-12), 1,000 MCG PO DAILY


Insulin Detemir (Levemir), 50 UNITS SC BID


Losartan Potassium (Cozaar), 100 MG PO DAILY


Metformin Hcl (Glucophage), 500 MG PO QAM


Metformin Hcl (Glucophage), 1,000 MG PO QPM


Multivitamin (Multivitamin), 1 TAB PO DAILY


Turmeric (Curcuma Longa) (Turmeric), 500 MG PO DAILY





Allergies


Coded Allergies:  


     No Known Allergies (Verified , 10/9/17)





Physical Exam


Vital Signs











  Date Time  Temp Pulse Resp B/P (MAP) Pulse Ox O2 Delivery O2 Flow Rate FiO2


 


10/9/17 09:57     99 Room Air  


 


10/9/17 09:46  62 20 152/74 99 Room Air  


 


10/9/17 09:07  62 20 146/82 99 Room Air  


 


10/9/17 08:02  59      


 


10/9/17 07:43 36.6 77 20 166/96 97 Room Air  











Physical Exam


VITAL SIGNS: were reviewed as above.


GENERAL:Non-toxic in appearance.


SKIN: Warm dry and pink.


HEAD: Normocephalic and atraumatic.


OROPHARYNX: Is clear and moist


NECK: Supple without lymphadenopathy or meningismus.


LUNGS: clear.


HEART: Regular rate and rhythm.


ABDOMEN: Soft and nontender.


EXTREMITIES: Warm and well perfused.


NEUROLOGICALLY: Awake alert and oriented without focal deficit. Cranial nerves 2

-12 are intact. There is no pronator drift. Cerebellar testing is within normal 

limits. There is no nystagmus. There is no facial droop. Speech is clear. 

Vision is grossly normal.


MUSCULOSKELETAL: Good muscle tone. No evidence of trauma.





Medical Decision & Procedures


ER Provider


Diagnostic Interpretation:


Radiology results as stated below per my review and radiologist interpretation:





CT SCAN OF THE BRAIN WITHOUT IV CONTRAST





FINDINGS:





Brain parenchyma: There are age-related involutional changes noting  mild


subcortical and periventricular microangiopathic change. A small focus of left


cerebellar encephalomalacia is consistent with a remote infarct. An age


indeterminant lacunar infarct is also seen in the left basal ganglia. There is


no hemorrhage, mass effect, or evidence of acute territorial ischemia by CT


criteria. Gray-white matter is preserved. No extra-axial fluid collection is


seen.





Ventricles, sulci, cisterns: Prominent secondary to involutional change.





Intracranial vasculature: There is atherosclerotic calcification of the


cavernous carotid arteries.





Calvarium: Unremarkable.





Sinuses and mastoids: The visualized paranasal sinuses are clear. The mastoid


air cells are well pneumatized.





Orbits: The bony orbits are grossly intact.








IMPRESSION: There is no hemorrhage, mass effect, or evidence of acute


territorial ischemia by CT criteria.





Electronically signed by:  Jason Clemons M.D.


10/9/2017 8:16 AM








SINGLE VIEW CHEST





FINDINGS: An AP, portable, upright chest radiograph is compared to study dated


11/12/2015. The cardiomediastinal silhouette is unremarkable. There is


atherosclerotic calcification of the thoracic aorta. There is mild elevation of


left hemidiaphragm with left basilar atelectasis. No airspace consolidation is


seen typical for pneumonia and there is no large pleural effusion. No


pneumothorax is seen. The bony thorax is grossly intact.





IMPRESSION: No acute cardiopulmonary abnormality.





Electronically signed by:  Jason Clemons M.D.


10/9/2017 8:22 AM





Laboratory Results


10/9/17 08:00








Red Blood Count 4.52, Mean Corpuscular Volume 92.3, Mean Corpuscular Hemoglobin 

30.5, Mean Corpuscular Hemoglobin Concent 33.1, Mean Platelet Volume 10.3, 

Neutrophils (%) (Auto) 49.4, Lymphocytes (%) (Auto) 40.0, Monocytes (%) (Auto) 

7.2, Eosinophils (%) (Auto) 2.4, Basophils (%) (Auto) 0.6, Neutrophils # (Auto) 

2.67, Lymphocytes # (Auto) 2.16, Monocytes # (Auto) 0.39, Eosinophils # (Auto) 

0.13, Basophils # (Auto) 0.03





10/9/17 08:00

















Test


  10/9/17


08:00 10/9/17


09:32


 


White Blood Count


  5.40 K/uL


(4.8-10.8) 


 


 


Red Blood Count


  4.52 M/uL


(4.7-6.1) 


 


 


Hemoglobin


  13.8 g/dL


(14.0-18.0) 


 


 


Hematocrit 41.7 % (42-52)  


 


Mean Corpuscular Volume


  92.3 fL


() 


 


 


Mean Corpuscular Hemoglobin


  30.5 pg


(25-34) 


 


 


Mean Corpuscular Hemoglobin


Concent 33.1 g/dl


(32-36) 


 


 


Platelet Count


  156 K/uL


(130-400) 


 


 


Mean Platelet Volume


  10.3 fL


(7.4-10.4) 


 


 


Neutrophils (%) (Auto) 49.4 %  


 


Lymphocytes (%) (Auto) 40.0 %  


 


Monocytes (%) (Auto) 7.2 %  


 


Eosinophils (%) (Auto) 2.4 %  


 


Basophils (%) (Auto) 0.6 %  


 


Neutrophils # (Auto)


  2.67 K/uL


(1.4-6.5) 


 


 


Lymphocytes # (Auto)


  2.16 K/uL


(1.2-3.4) 


 


 


Monocytes # (Auto)


  0.39 K/uL


(0.11-0.59) 


 


 


Eosinophils # (Auto)


  0.13 K/uL


(0-0.5) 


 


 


Basophils # (Auto)


  0.03 K/uL


(0-0.2) 


 


 


RDW Standard Deviation


  43.3 fL


(36.4-46.3) 


 


 


RDW Coefficient of Variation


  12.9 %


(11.5-14.5) 


 


 


Immature Granulocyte % (Auto) 0.4 %  


 


Immature Granulocyte # (Auto)


  0.02 K/uL


(0.00-0.02) 


 


 


Prothrombin Time


  10.7 SECONDS


(9.0-12.0) 


 


 


Prothromb Time International


Ratio 1.0 (0.9-1.1) 


  


 


 


Activated Partial


Thromboplast Time 25.0 SECONDS


(21.0-31.0) 


 


 


Partial Thromboplastin Ratio 1.0  


 


Urine Color YELLOW  


 


Urine Appearance CLEAR (CLEAR)  


 


Urine pH 5.0 (4.5-7.5)  


 


Urine Specific Gravity


  1.030


(1.000-1.030) 


 


 


Urine Protein NEG (NEG)  


 


Urine Glucose (UA) 2+ (NEG)  


 


Urine Ketones TRACE (NEG)  


 


Urine Occult Blood NEG (NEG)  


 


Urine Nitrite NEG (NEG)  


 


Urine Bilirubin NEG (NEG)  


 


Urine Urobilinogen NEG (NEG)  


 


Urine Leukocyte Esterase NEG (NEG)  


 


Urine WBC (Auto) 1-5 /hpf (0-5)  


 


Urine RBC (Auto) 0-4 /hpf (0-4)  


 


Urine Hyaline Casts (Auto) 0 /lpf (0-5)  


 


Urine Epithelial Cells (Auto) 0-5 /lpf (0-5)  


 


Urine Bacteria (Auto) NEG (NEG)  


 


Urine Crystals


  CALCIUM


OXALATE (NONE 


 


 


Anion Gap


  9.0 mmol/L


(3-11) 


 


 


Est Creatinine Clear Calc


Drug Dose 75.6 ml/min 


  


 


 


Estimated GFR (


American) 87.4 


  


 


 


Estimated GFR (Non-


American 75.4 


  


 


 


BUN/Creatinine Ratio 12.9 (10-20)  


 


Calcium Level


  9.7 mg/dl


(8.5-10.1) 


 


 


Magnesium Level


  1.5 mg/dl


(1.8-2.4) 


 


 


Total Bilirubin


  0.8 mg/dl


(0.2-1) 


 


 


Direct Bilirubin


  0.2 mg/dl


(0-0.2) 


 


 


Aspartate Amino Transf


(AST/SGOT) 34 U/L (15-37) 


  


 


 


Alanine Aminotransferase


(ALT/SGPT) 31 U/L (12-78) 


  


 


 


Alkaline Phosphatase


  86 U/L


() 


 


 


Total Creatine Kinase


  86 U/L


() 


 


 


Creatine Kinase MB


  0.6 ng/ml


(0.5-3.6) 


 


 


Creatine Kinase MB Ratio 0.7 (0-3.0)  


 


Troponin I


  < 0.015 ng/ml


(0-0.045) 


 


 


Total Protein


  7.4 gm/dl


(6.4-8.2) 


 


 


Albumin


  4.0 gm/dl


(3.4-5.0) 


 


 


Lipase


  524 U/L


() 


 


 


Thyroid Stimulating Hormone


(TSH) 2.520 uIu/ml


(0.300-4.500) 


 





Laboratory results as stated above per my review.





Medications Administered











 Medications


  (Trade)  Dose


 Ordered  Sig/Sriram


 Route  Start Time


 Stop Time Status Last Admin


Dose Admin


 


 Acetaminophen


  (Tylenol Tab)  650 mg  NOW  STAT


 PO  10/9/17 09:16


 10/9/17 09:40 DC 10/9/17 09:43


650 MG











ECG


Indication:  other (neurologic symptoms )


Rate (beats per minute):  62


Rhythm:  sinus rhythm


Findings:  PAC, no acute ischemic change





ED Course


0817: Previous medical records were reviewed. The patient was evaluated in room 

B12B. A complete history and physical examination was performed.





0850: On reevaluation, the patient is resting comfortably. I discussed the 

results and findings with him. He verbalized agreement of the treatment plan. I 

spoke with Dr. Pierce of the [facility]. The patient will be 

evaluated for further management and care.





Medical Decision


Differential includes acute coronary syndrome, myocardial infarction, CVA, TIA, 

anemia, infection, pneumonia, UTI, pyelonephritis, poor nutrition, dehydration, 

electrolyte disturbance,hypoglycemia.





This is a 71-year-old male who presents to the ED with a chief complaint of CVA-

like symptoms.  The patient states that around 6 AM he developed a sudden onset 

of loss of function of his right hand and felt weak in his arm and slight 

facial droop in his right face.  He may have also felt weakness in his right 

leg but this was more difficult for him to tell due to some chronic issues with 

his right leg.  The patient states that he took some aspirin and came to the ED 

for evaluation.  His initial blood pressure was slightly elevated at 166/96.  

The patient states that his symptoms have improved upon my evaluation.  His 

complete neuro exam on my evaluation was normal.  He states that he has now 

regained use of his arm and hand.  A CT scan of the brain did not show acute 

process.  A chest x-ray is negative for acute disease.  Blood work including a 

CBC and chemistry panel was unremarkable.  Urine did not show infection.  EKG 

shows a normal sinus rhythm.  The patient was told the results.  Because of his 

symptoms, the patient was seen for further inpatient evaluation and care.





Consults


Time Called:  0842


Consulting Physician:  Dr. Pierce -[facility]


Returned Call:  [phone]


Discussed the patient's case. The patient will be evaluated for further 

treatment and disposition.





Impression





 Primary Impression:  


 TIA (transient ischemic attack)





Scribe Attestation


The scribe's documentation has been prepared under my direction and personally 

reviewed by me in its entirety. I confirm that the note above accurately 

reflects all work, treatment, procedures, and medical decision making performed 

by me.





Departure Information


Dispostion


Being Evaluated By Hospitalist





Vishnu Norwood M.D. (PCP)





Patient Instructions


My [facility]

## 2017-10-10 VITALS
HEART RATE: 53 BPM | TEMPERATURE: 97.88 F | OXYGEN SATURATION: 95 % | DIASTOLIC BLOOD PRESSURE: 67 MMHG | SYSTOLIC BLOOD PRESSURE: 114 MMHG

## 2017-10-10 VITALS
DIASTOLIC BLOOD PRESSURE: 84 MMHG | OXYGEN SATURATION: 96 % | TEMPERATURE: 98.06 F | HEART RATE: 55 BPM | SYSTOLIC BLOOD PRESSURE: 150 MMHG

## 2017-10-10 VITALS
TEMPERATURE: 98.06 F | SYSTOLIC BLOOD PRESSURE: 150 MMHG | DIASTOLIC BLOOD PRESSURE: 84 MMHG | HEART RATE: 55 BPM | OXYGEN SATURATION: 95 %

## 2017-10-10 VITALS
SYSTOLIC BLOOD PRESSURE: 113 MMHG | OXYGEN SATURATION: 94 % | TEMPERATURE: 97.88 F | DIASTOLIC BLOOD PRESSURE: 52 MMHG | HEART RATE: 53 BPM

## 2017-10-10 VITALS
SYSTOLIC BLOOD PRESSURE: 115 MMHG | HEART RATE: 52 BPM | TEMPERATURE: 97.88 F | DIASTOLIC BLOOD PRESSURE: 67 MMHG | OXYGEN SATURATION: 95 %

## 2017-10-10 VITALS — OXYGEN SATURATION: 95 %

## 2017-10-10 LAB
ANION GAP SERPL CALC-SCNC: 4 MMOL/L (ref 3–11)
BASOPHILS # BLD: 0.01 K/UL (ref 0–0.2)
BASOPHILS NFR BLD: 0.2 %
BUN SERPL-MCNC: 14 MG/DL (ref 7–18)
BUN/CREAT SERPL: 13.6 (ref 10–20)
CALCIUM SERPL-MCNC: 9.2 MG/DL (ref 8.5–10.1)
CHLORIDE SERPL-SCNC: 111 MMOL/L (ref 98–107)
CHOLEST/HDLC SERPL: 2.9 {RATIO}
CO2 SERPL-SCNC: 28 MMOL/L (ref 21–32)
COMPLETE: YES
CREAT CL PREDICTED SERPL C-G-VRATE: 74.8 ML/MIN
CREAT SERPL-MCNC: 1 MG/DL (ref 0.6–1.4)
EOSINOPHIL NFR BLD AUTO: 148 K/UL (ref 130–400)
GLUCOSE SERPL-MCNC: 149 MG/DL (ref 70–99)
GLUCOSE UR QL: 39 MG/DL
HCT VFR BLD CALC: 38.5 % (ref 42–52)
IG%: 0.2 %
IMM GRANULOCYTES NFR BLD AUTO: 43.3 %
KETONES UR QL STRIP: 39 MG/DL
LYMPHOCYTES # BLD: 2.58 K/UL (ref 1.2–3.4)
MCH RBC QN AUTO: 30.5 PG (ref 25–34)
MCHC RBC AUTO-ENTMCNC: 33.2 G/DL (ref 32–36)
MCV RBC AUTO: 91.7 FL (ref 80–100)
MONOCYTES NFR BLD: 8.4 %
NEUTROPHILS # BLD AUTO: 2.9 %
NEUTROPHILS NFR BLD AUTO: 45 %
NITRITE UR QL STRIP: 178 MG/DL (ref 0–150)
PH UR: 114 MG/DL (ref 0–200)
PMV BLD AUTO: 10.4 FL (ref 7.4–10.4)
POTASSIUM SERPL-SCNC: 4.1 MMOL/L (ref 3.5–5.1)
RBC # BLD AUTO: 4.2 M/UL (ref 4.7–6.1)
SODIUM SERPL-SCNC: 143 MMOL/L (ref 136–145)
VERY LOW DENSITY LIPOPROT CALC: 36 MG/DL
WBC # BLD AUTO: 5.96 K/UL (ref 4.8–10.8)

## 2017-10-10 RX ADMIN — INSULIN ASPART SCH UNITS: 100 INJECTION, SOLUTION INTRAVENOUS; SUBCUTANEOUS at 13:35

## 2017-10-10 RX ADMIN — INSULIN ASPART SCH UNITS: 100 INJECTION, SOLUTION INTRAVENOUS; SUBCUTANEOUS at 08:33

## 2017-10-10 RX ADMIN — ACETAMINOPHEN PRN MG: 325 TABLET ORAL at 09:44

## 2017-10-10 RX ADMIN — INSULIN ASPART SCH UNITS: 100 INJECTION, SOLUTION INTRAVENOUS; SUBCUTANEOUS at 01:06

## 2017-10-10 RX ADMIN — ACETAMINOPHEN PRN MG: 325 TABLET ORAL at 00:09

## 2017-10-10 RX ADMIN — INSULIN DETEMIR SCH UNITS: 100 INJECTION, SOLUTION SUBCUTANEOUS at 08:34

## 2017-10-10 NOTE — DISCHARGE SUMMARY
Discharge Summary


Date of Service


Oct 10, 2017.


 (Sherri Hastings .RENU)





Discharge Summary


Admission Date:


Oct 9, 2017 at 10:31


Discharge Date:  Oct 10, 2017


Discharge Disposition:  Home


Principal Diagnosis:  CVA


Immunizations:  


   Have You Had Influenza Vaccine:  Yes


   Influenza Vaccine Date:  Oct 18, 2006


   History of Tetanus Vaccine?:  Yes


   History of Pneumococcal:  Yes


   Pneumococcal Date:  Oct 18, 2006


   History of Hepatitis B Vaccine:  No


Procedures:


CTA Neck


IMPRESSION: 


1. Unremarkable CTA of the neck for age. No significant stenosis within the


bilateral common carotid, internal carotid or vertebral arteries. Mild


atherosclerotic plaque. No dissection.





2. Mild dilatation of visualized portions of the distal ascending aorta,


measuring approximately 4.2 cm.





Brain MRI


IMPRESSION:  Scattered very small foci of restricted water diffusion within the


distribution of left middle cerebral artery, consistent with foci of


acute/subacute infarction





Head MRA


IMPRESSION: Unremarkable MR angiogram of the brain. 





Carotid US


IMPRESSION: No evidence of hemodynamically significant carotid stenosis.





CXR


IMPRESSION: No acute cardiopulmonary abnormality.





Head CT





Brain parenchyma:  A small focus of left


cerebellar encephalomalacia is consistent with a remote infarct. An age


indeterminant lacunar infarct is also seen in the left basal ganglia. 





IMPRESSION: There is no hemorrhage, mass effect, or evidence of acute


territorial ischemia by CT criteria.


























 (Sherri Hastings .RENU)





Medication Reconciliation


New Medications:  


Clopidogrel Bisulfate (Clopidogrel) 75 Mg Tab


75 MG PO QAM for 30 Days, #30 TAB





 


Continued Medications:  


Amlodipine (Norvasc) 5 Mg Tab


5 MG PO DAILY





Atorvastatin (Lipitor) 40 Mg Tab


40 MG PO DAILY





Cholecalciferol (Vitamin D-3) 2,000 Unit Tab


2000 UNITS PO DAILY





Cyanocobalamin (Vitamin B-12) 1,000 Mcg Tab


1000 MCG PO DAILY





Insulin Detemir (Levemir) 100 Units/Ml Inj


50 UNITS SC BID





Losartan Potassium (Cozaar) 100 Mg Tab


100 MG PO DAILY





Metformin Hcl (Glucophage) 500 Mg Tab


500 MG PO QAM





Metformin Hcl (Glucophage) 1,000 Mg Tab


1000 MG PO QPM





Multivitamin (Multivitamin)  Tab


1 TAB PO DAILY





Turmeric (Curcuma Longa) (Turmeric) 500 Mg Cap


500 MG PO DAILY





 


Discontinued Medications:  


Aspirin (Aspirin Ec) 81 Mg Tab


81 MG PO DAILY











Discharge Exam


Review of Systems:  


   Respiratory:  No shortness of breath


   Cardiovascular:  No chest pain, No palpitations


   Abdomen:  No nausea, No vomiting


   Neurologic:  No paralysis, No weakness, No numbness/tingling, No vertigo, No 

balance problems


Physical Exam:  


   General Appearance:  WD/WN, no apparent distress


   Eyes:  normal inspection


   Respiratory/Chest:  chest non-tender, lungs clear, normal breath sounds, no 

respiratory distress, no accessory muscle use


   Cardiovascular:  regular rate, rhythm, no edema, no gallop, no murmur, 

normal peripheral pulses


   Abdomen / GI:  normal bowel sounds, non tender, soft, no organomegaly


   Extremities:  normal inspection, normal capillary refill, no pedal edema, 

normal range of motion


   Neurologic/Psychiatric:  CNs II-XII nml as tested, no motor/sensory deficits

, alert, normal mood/affect, oriented x 3


   Skin:  normal color, warm/dry


 (Sherri Hastings CRNP)





Hospital Course


Mr. Neumann is a 71 year old man here for CVA.  His symptoms which included right 

hand weakness and right facial droop lasted about two hours and have resolved 

at this point. CT of the head showed a remote left cerebellar infarct and left 

age indeterminate basal ganglia lacunar infarct. MRI revealed a left MCA acute/

subacute CVA





CVA


- admit to the telemetry unit


- A1c, lipids, troponin, prp and cbc in the morning


- MRI/MRA head, carotid doppler, echocardiogram, CTA neck


- ASA changed to clopidogrel per neurology


- AHA diet





HTN 


- home htn meds held for now. 


- hydralazine prn elevated bp





DM


- BSG AC & HS


- Levemir and ss





Hypomagnesemia


- Mag 1.5 - 3 gm IV to replace





Osteoarthritis


- Tylenol prn





Hyperlipidemia


- continue statin





DVT prophylaxis - enoxaprin and SCDs


Resuscitation status - full resuscitation


Total Time Spent:  Less than 30 minutes


This includes examination of the patient, discharge planning, medication 

reconciliation, and communication with other providers.


 (Sherri Hastings CRNP)


NP Physician Supervision Note:





I interviewed and examined the patient. Discussed with Sherri Hastings NP and 

agree with findings and plan as documented in the note. Any exceptions or 

clarifications are listed here: None





Patient presented with acute what looks like embolic left MCA stroke is no 

signs of embolic source for significant stenosis of his great vessels of his 

neck.  The patient has almost complete resolution of his initial presenting 

symptoms of right-sided hand and leg weakness with facial droop he does have 

some kidney issues related to previous knee replacement





Vitals are stable neurologically no residual symptoms are noted his heart is 

regular





Patient was seen by neurology who recommended Plavix therapy continue his 

atorvastatin and will have an outpatient neurology follow-up





Documented By:  Shaw Pierce


 (Shaw Pierce M.D.)


Discharge Instructions


Please refer to the electronic Patient Visit Report (Discharge Instructions) 

for additional information.


 (Sherri Hastings ., RENU)

## 2017-10-10 NOTE — DIAGNOSTIC IMAGING REPORT
CT ANGIOGRAPHY OF THE NECK WITH CONTRAST



CLINICAL HISTORY: Stroke.    



COMPARISON STUDY:  Carotid ultrasound October 9, 2017. 



Technique: CT angiography of the carotid and vertebral arteries was obtained

using Optiray 320 IV and 3D reconstruction on an independent workstation. NASCET

criteria was utilized.  A dose lowering technique was utilized adhering to the

principles of ALARA.



CT DOSE: 588.64 mGy.cm



Findings: There is mild dilatation of visualized portions of the distal

ascending aorta which measures approximately 4.2 cm in caliber. The caliber of

the aortic arch is normal. There is mild atherosclerotic plaque of the aortic

arch. The bilateral common carotid, internal carotid and vertebral arteries are

patent. There is no significant stenosis within these vessels and there is no

evidence for dissection. There is mild atherosclerotic plaque within the

proximal bilateral internal carotid arteries. The right vertebral artery is

dominant and patent. The left vertebral artery is also patent. An old left

cerebellar infarct is noted within visual portions of the brain. Lung apices are

clear. There is no cervical lymphadenopathy.



IMPRESSION: 





1. Unremarkable CTA of the neck for age. No significant stenosis within the

bilateral common carotid, internal carotid or vertebral arteries. Mild

atherosclerotic plaque. No dissection.



2. Mild dilatation of visualized portions of the distal ascending aorta,

measuring approximately 4.2 cm.







Electronically signed by:  Tanvir Nunez M.D.

10/10/2017 11:04 AM



Dictated Date/Time:  10/10/2017 10:56 AM

## 2017-10-10 NOTE — DISCHARGE INSTRUCTIONS
Discharge Instructions


Date of Service


Oct 10, 2017.





Admission


Reason for Admission:  TIA





Discharge


Discharge Diagnosis / Problem:  Stroke





Discharge Goals


Goal(s):  Improve function, Improve disease control





Activity Recommendations


Activity Limitations:  resume your previous activity





.





Instructions / Follow-Up


Instructions / Follow-Up





Risk Factors for Stroke:





You can reduce your chances of stroke by working with your medical provider to 

adopt a healthy lifestyle.  Some specific ways to lower your chance of stroke 

are:





* Stop the use of any tobacco products


* If you are diabetic, improve the control of your blood sugars


* Avoid excessive amounts of alcohol


* Control high blood pressure


* Lose weight if you are overweight


* Be sure to lead an active lifestyle


* Eat a healthy diet low in salt, cholesterol and fat





You should know about other risk factors for stroke that you are unable to 

control.  These include:





* Age 55 years or older


* Male gender


* Certain racial groups: ,  or /


* Family History of Stroke, Mini stroke or Heart Attack


* Sickle Cell Disease





Follow Up:





It is important for you to keep your follow up appointments with your medical 

provider.





Hold metformin for 48 hours following CTA dye - restart on 10/13


Increase Levemir by 5 units to 55 units BID for the days you are not taking 

your metformin and take your blood sugar before meals and before bed.  Reduce 

Levemir back to your normal dose of 50 units BID after you restart taking 

metformin














Current Hospital Diet


Patient's current hospital diet: AHA Diet (Heart Healthy)





Discharge Diet


Recommended Diet:  AHA Diet (Heart Healthy)





Procedures


Procedures Performed:  


CT angiogrophy of the neck


Brain MRI/MRA


CT head


Carotid ultrasound


Echocardiogram





Pending Studies


Studies pending at discharge:  no





Laboratory Results





Hemoglobin A1c








Test


  10/9/17


08:00 Range/Units


 


 


Estimated Average Glucose 240   mg/dl


 


Hemoglobin A1c 10.0 H 4.5-5.6  %








Lipid Panel








Test


  10/10/17


07:43 Range/Units


 


 


Triglycerides Level 178 H 0-150  mg/dl


 


Cholesterol Level 114  0-200  mg/dl


 


HDL Cholesterol 39   mg/dl


 


Cholesterol/HDL Ratio 2.9   


 


LDL Cholesterol, Calculated 39   mg/dl











Medical Emergencies








.


Who to Call and When:





Medical Emergencies:  Call 911 immediately if you experience any of the 

following warning signs and symptoms of Stroke:





* Sudden numbness or weakness of the face, arm or leg, especially on one side 

of the body


* Sudden confusion, trouble speaking or understanding


* Sudden trouble seeing in one or both eyes


* Sudden trouble walking, dizziness, loss of balance or coordination


* Sudden severe headache with no cause





Do not delay calling 911 if you experience any warning signs or symptoms of a 

stroke.





Delay in seeking medical attention may affect what treatments can be given to 

you.








.





Non-Emergent Contact


Non-Emergency issues call your:  Primary Care Provider


Call Non-Emergent contact if:  you have any medication questions





.





Past History


Medical & Surgical History:  


(1) Diabetes


(2) CVA (cerebral vascular accident)


.








"Provider Documentation" section prepared by Sherri Hastings.








.





Stroke Core Measures


Reason no t-PA for Stroke:  Treatment not indicated


Reason no antithrom by day 2:  Treatment not indicated


Reason no antithrom at D/C:  Treatment not indicated


Reason no statin at D/C:  Treatment provided - N/A


Reason no anticoag w/a fib:  Treatment not indicated





VTE Core Measure


Inpt VTE Proph given/why not?:  Enoxaparin (Lovenox)SQ

## 2017-10-10 NOTE — NEUROLOGY CONSULTATION
Neurology Consultation


Date of Consultation:


Oct 10, 2017.


Attending Physician:


Shaw Pierce M.D.


Primary Care Physician:


Vishnu Hastings M.D.


Reason for Consultation:


Consultation for stroke


History of Present Illness


Source:  patient, hospital records


This is a 71-year-old right-handed male who presents with sudden onset of right 

hand weakness and facial droop yesterday morning. He reports that he was 

sitting in bed watching TV at about 5:30 AM when he went to reach for something 

on the table and found that he had trouble using his hand. He denied any 

weakness or neurological symptoms when he woke up that morning. Also noted to 

have a very mild right facial droop. He denied any numbness. He denied any 

weakness in his. He denied any new trouble with his walking or gait. He reports 

that for the last 6 years he's had trouble walking a straight line and his 

balance his been not as good as it used to be. He related it to his knee 

surgery at around that time. He denied any changes with his vision or vision 

loss. Denied any changes with his speech. Denied any trouble swallowing. Denied 

any chest pain or shortness of breath. Denies any heart palpitations. Reports 

that he has been taking baby aspirin for a number of years and has been 

compliant. Denies any history of strokes or TIAs in the past. He does chew 

tobacco on a regular basis.





Patient reports that at around 9 or 9:30am yesterday morning he felt like his 

symptoms resolved. He reports that he is at his neurological baseline this 

morning.





MRI of the brain report and images were reviewed by myself. Noted small 

scattered acute to subacute ischemic strokes in the left MCA territory. In 

addition noted small old left cerebellar stroke. Minimal T2 hyperintensities in 

the subcortical white matter indicative of small vessel ischemic disease.





MRA of the brain was unremarkable





Ultrasound of the carotids were unremarkable





Echocardiogram noted mild dilated aorta and mild dilated right ventricle





Hemoglobin A1c 10.0


Total cholesterol 114, LDL 39, HDL 39, triglycerides 178





Past Medical/Surgical History


Past medical history:


Diabetes type 2 insulin-dependent


Hypertension


Dyslipidemia


Chews tobacco


Sciatica


Right knee surgery


Multiple surgeries on right hand for trigger fingers





Family History


Mother with dementia


Denies any family history of strokes or heart attacks





Social History


Is independent in his activities of daily living. Currently employed. Chews 

tobacco. No illegal drug use or stimulant supplement use.


Smoking Status:  Former smoker


Smokeless Tobacco Use:  Yes


Alcohol Use:  socially


Drug Use:  none


Marital Status:  


Housing Status:  lives with family


Occupation Status:  retired





Allergies


Coded Allergies:  


     No Known Allergies (Verified , 10/9/17)





Current Inpatient Medications





Current Inpatient Medications








 Medications


  (Trade)  Dose


 Ordered  Sig/Sriram


 Route  Start Time


 Stop Time Status Last Admin


Dose Admin


 


 Miscellaneous


 Information


  (Pharmacist


 Discharge Med Rec


 Consult)  1 ea  UD  PRN


 N/A  10/9/17 09:45


 11/8/17 09:44   


 


 


 Enoxaparin Sodium


  (Lovenox Inj)  40 mg  HS


 SC  10/9/17 21:00


 11/8/17 20:59  10/9/17 20:30


40 MG


 


 Acetaminophen


  (Tylenol Tab)  650 mg  Q4H  PRN


 PO  10/9/17 09:45


 11/8/17 09:44  10/10/17 00:09


650 MG


 


 Atorvastatin


 Calcium


  (Lipitor Tab)  40 mg  DAILY


 PO  10/10/17 09:00


 11/9/17 08:59  10/10/17 08:30


40 MG


 


 Cyanocobalamin


  (Vitamin B-12


 Tab)  1,000 mcg  DAILY


 PO  10/10/17 09:00


 11/9/17 08:59  10/10/17 08:31


1,000 MCG


 


 Multivitamins


  (Multivitamin


 Tab)  1 tab  DAILY


 PO  10/10/17 09:00


 11/9/17 08:59  10/10/17 08:30


1 TAB


 


 Cholecalciferol


  (Vitamin D Tab)  2,000


 inter.unit  QAM


 PO  10/10/17 09:00


 11/9/17 08:59  10/10/17 08:31


2,000 INTER.UNIT


 


 Insulin Detemir


  (Levemir Insulin)  50 units  BID


 SC  10/9/17 21:00


 11/8/17 20:59  10/10/17 08:34


50 UNITS


 


 Insulin Aspart


  (novoLOG ASPART)  **SLIDING


 SCALE**


 **If C...  ACHS


 SC  10/9/17 11:00


 11/8/17 10:59  10/10/17 08:33


1 UNITS


 


 Hydralazine HCl


  (HydrALAZINE INJ)  10 mg  Q4H  PRN


 IV.  10/9/17 10:15


 11/8/17 10:14   


 


 


 Aspirin


  (Ecotrin Tab)  325 mg  QAM


 PO  10/10/17 09:00


 11/9/17 08:59  10/10/17 08:30


325 MG


 


 Glucose


  (Glucose 40% Gel)  15-30


 GRAMS 15


 GRAMS...  UD  PRN


 PO  10/9/17 11:45


 11/8/17 11:44   


 


 


 Glucose


  (Glucose Chew


 Tab)  4-8


 Tablets 4


 Tabl...  UD  PRN


 PO  10/9/17 11:45


 11/8/17 11:44   


 


 


 Dextrose


  (Dextrose 50%


 50ML Syringe)  25-50ML OF


 50% DW IV


 FOR...  UD  PRN


 IV  10/9/17 11:45


 11/8/17 11:44   


 


 


 Glucagon


  (Glucagon Inj)  1 mg  UD  PRN


 SQ  10/9/17 11:45


 11/8/17 11:44   


 


 


 Miscellaneous


  (Iv Fluids


 Completed)  1 ea  PRN  PRN


 N/A  10/9/17 12:15


 10/9/18 12:14   


 











Review of Systems


Complete review of systems otherwise negative except for the above noted in 

history of present illness





Physical Exam


Vital Signs (Past 24 Hrs):











  Date Time  Temp Pulse Resp B/P (MAP) Pulse Ox O2 Delivery O2 Flow Rate FiO2


 


10/10/17 07:20 36.6 52 16 115/67 (83) 95 Room Air  


 


10/10/17 04:00 36.6 53 20 114/67 (83) 95 Room Air  


 


10/10/17 04:00      Room Air  


 


10/10/17 00:04 36.6 53 20 113/52 (72) 94 Room Air  


 


10/10/17 00:00      Room Air  


 


10/9/17 20:19 36.5 61 18 128/66 (86) 91 Room Air  


 


10/9/17 20:00      Room Air  


 


10/9/17 16:00      Room Air  


 


10/9/17 15:47 36.6 62 18 147/71 (96) 95 Room Air  


 


10/9/17 11:30 36.7 57 18 174/83 (113) 94 Room Air  


 


10/9/17 10:43  52 20 141/66 99   


 


10/9/17 09:57     99 Room Air  


 


10/9/17 09:46  62 20 152/74 99 Room Air  


 


10/9/17 09:07  62 20 146/82 99 Room Air  








Gen.: Patient is alert and sitting on side of bed, in no acute distress.


HEENT: Normocephalic /atraumatic, no scleral icterus


Heart: Regular rate and rhythm


Extremities: No gross deformities or rashes noted


Neurological examination: Mental status:  Patient is alert and oriented x3. 

Attention and concentration normal for the situation. Good fund of knowledge. 

Able to give his own history. 


Speech is fluent without any dysarthria or aphasia noted


Cranial nerve: Funduscopic examination was unremarkable. No papilledema. Pupils 

equally round and reactive to light. Extraocular muscles intact without 

nystagmus. No facial asymmetry noted. Facial sensation intact. Tongue is 

midline. Good palatal elevation. Good shoulder shrug bilaterally. Hearing 

grossly intact to voice. 


Strength: 5/5 both proximal and distally in all extremities. There is no arm 

drift. Tone is normal.


Sensation: Grossly intact to light touch in all extremities. Mildly positive 

Romberg testing


Deep tendon reflexes: +1 in bilateral biceps, brachioradialis and patellar. 


Coordination: Patient had good finger to nose without dysmetria


Station within the bed was normal. Gait was slightly wide-based but otherwise 

appeared stable with no noticeable ataxia.





Laboratory Results


Past 24 Hours:


10/10/17 07:43








Red Blood Count 4.20, Mean Corpuscular Volume 91.7, Mean Corpuscular Hemoglobin 

30.5, Mean Corpuscular Hemoglobin Concent 33.2, Mean Platelet Volume 10.4, 

Neutrophils (%) (Auto) 45.0, Lymphocytes (%) (Auto) 43.3, Monocytes (%) (Auto) 

8.4, Eosinophils (%) (Auto) 2.9, Basophils (%) (Auto) 0.2, Neutrophils # (Auto) 

2.69, Lymphocytes # (Auto) 2.58, Monocytes # (Auto) 0.50, Eosinophils # (Auto) 

0.17, Basophils # (Auto) 0.01





10/10/17 07:43

















Test


  10/9/17


23:14 10/10/17


07:32 10/10/17


07:43


 


Troponin I


  < 0.015 ng/ml


(0-0.045) 


  


 


 


Bedside Glucose


  


  147 mg/dl


(70-99) 


 


 


White Blood Count


  


  


  5.96 K/uL


(4.8-10.8)


 


Red Blood Count


  


  


  4.20 M/uL


(4.7-6.1)


 


Hemoglobin


  


  


  12.8 g/dL


(14.0-18.0)


 


Hematocrit   38.5 % (42-52) 


 


Mean Corpuscular Volume


  


  


  91.7 fL


()


 


Mean Corpuscular Hemoglobin


  


  


  30.5 pg


(25-34)


 


Mean Corpuscular Hemoglobin


Concent 


  


  33.2 g/dl


(32-36)


 


Platelet Count


  


  


  148 K/uL


(130-400)


 


Mean Platelet Volume


  


  


  10.4 fL


(7.4-10.4)


 


Neutrophils (%) (Auto)   45.0 % 


 


Lymphocytes (%) (Auto)   43.3 % 


 


Monocytes (%) (Auto)   8.4 % 


 


Eosinophils (%) (Auto)   2.9 % 


 


Basophils (%) (Auto)   0.2 % 


 


Neutrophils # (Auto)


  


  


  2.69 K/uL


(1.4-6.5)


 


Lymphocytes # (Auto)


  


  


  2.58 K/uL


(1.2-3.4)


 


Monocytes # (Auto)


  


  


  0.50 K/uL


(0.11-0.59)


 


Eosinophils # (Auto)


  


  


  0.17 K/uL


(0-0.5)


 


Basophils # (Auto)


  


  


  0.01 K/uL


(0-0.2)


 


RDW Standard Deviation


  


  


  42.6 fL


(36.4-46.3)


 


RDW Coefficient of Variation


  


  


  12.8 %


(11.5-14.5)


 


Immature Granulocyte % (Auto)   0.2 % 


 


Immature Granulocyte # (Auto)


  


  


  0.01 K/uL


(0.00-0.02)


 


Anion Gap


  


  


  4.0 mmol/L


(3-11)


 


Est Creatinine Clear Calc


Drug Dose 


  


  74.8 ml/min 


 


 


Estimated GFR (


American) 


  


  87.4 


 


 


Estimated GFR (Non-


American 


  


  75.4 


 


 


BUN/Creatinine Ratio   13.6 (10-20) 


 


Calcium Level


  


  


  9.2 mg/dl


(8.5-10.1)


 


Triglycerides Level


  


  


  178 mg/dl


(0-150)


 


Cholesterol Level


  


  


  114 mg/dl


(0-200)


 


HDL Cholesterol   39 mg/dl 


 


LDL Cholesterol, Calculated   39 mg/dl 


 


VLDL Cholesterol, Calculated   36 mg/dl 


 


Cholesterol/HDL Ratio   2.9 











Imaging


As noted above in history of present illness





Impression


This is a 71-year-old right-handed male who presents with about 4 hours of 

right hand weakness and mild facial droop. Appears to be at neurological 

baseline this morning, but does have some baseline gait and balance 

dysfunction. Found to have scattered acute to subacute ischemic strokes in the 

left MCA territory. Etiology appears to be either large vessel embolic versus 

cardioembolic. Known stroke risk factors include diabetes type 2 insulin-

dependent, hypertension, dyslipidemia, and tobacco abuse. In addition also has 

evidence of a chronic small left cerebellar stroke on MRI.





Plan


I have ordered a CTA of the neck to complete stroke workup to look for 

dissection, critical stenosis, or other etiologies for large vessel embolic 

stroke.





Start Plavix for secondary stroke prevention (ordered). Can overlap with 

aspirin 81mg for the next 1-3 days, and then aspirin can be discontinued.





I have also ordered physical therapy and occupational therapy evaluation to 

evaluate for any outpatient physical therapy needs. Patient does not appear to 

have any speech therapy needs at this time.





Follow-up PT/OT evals for discharge planning. 


Blood pressure recommendations while in hospital 175//80


Avoid hypotension and dehydration 





Stroke risk factor modifications and recommendations:


Blood pressure recommendations for the first month post hospital discharge 150/

/80, and after that blood pressure recommendations 130//70


Total cholesterol goal 100- 200 and LDL goal less than 70 (at goal)


Hemoglobin A1c goal less than 7


Encourage cardiovascular exercise at least 3 times a week for 30 minutes.


Recommended tobacco cessation. 





Patient desires to be discharged today and reports that he cannot stay until 

tomorrow morning. Discussed with patient that we'll try to complete his workup 

but I did not make any promises. Went over stroke signs and symptoms with the 

patient and discussed that if he has any acute sudden new stroke symptoms, he 

should return to the emergency department for further evaluation.





Follow-up in neurology clinic in 1 month for post stroke hospital follow-up.


If no clear etiology found for the patient's stroke, would recommend Holter 

monitor as an outpatient to rule out paroxysmal A. fib.





If there is any questions or concerns, feel free to call/page me.

## 2017-12-11 ENCOUNTER — HOSPITAL ENCOUNTER (OUTPATIENT)
Dept: HOSPITAL 45 - C.LABPBG | Age: 72
Discharge: HOME | End: 2017-12-11
Attending: INTERNAL MEDICINE
Payer: COMMERCIAL

## 2017-12-11 DIAGNOSIS — E83.52: ICD-10-CM

## 2017-12-11 DIAGNOSIS — I10: Primary | ICD-10-CM

## 2017-12-11 DIAGNOSIS — E11.9: ICD-10-CM

## 2017-12-11 DIAGNOSIS — D64.9: ICD-10-CM

## 2017-12-11 LAB
ALBUMIN/GLOB SERPL: 1.2 {RATIO} (ref 0.9–2)
ALP SERPL-CCNC: 87 U/L (ref 45–117)
ALT SERPL-CCNC: 52 U/L (ref 12–78)
ANION GAP SERPL CALC-SCNC: 5 MMOL/L (ref 3–11)
AST SERPL-CCNC: 47 U/L (ref 15–37)
BASOPHILS # BLD: 0.02 K/UL (ref 0–0.2)
BASOPHILS NFR BLD: 0.4 %
BUN SERPL-MCNC: 17 MG/DL (ref 7–18)
BUN/CREAT SERPL: 16.1 (ref 10–20)
CALCIUM SERPL-MCNC: 9.9 MG/DL (ref 8.5–10.1)
CHLORIDE SERPL-SCNC: 109 MMOL/L (ref 98–107)
CHOLEST/HDLC SERPL: 3 {RATIO}
CO2 SERPL-SCNC: 25 MMOL/L (ref 21–32)
COMPLETE: YES
CREAT SERPL-MCNC: 1.03 MG/DL (ref 0.6–1.4)
EOSINOPHIL NFR BLD AUTO: 160 K/UL (ref 130–400)
FERRITIN SERPL-MCNC: 36.6 NG/ML (ref 8–388)
GLOBULIN SER-MCNC: 3.3 GM/DL (ref 2.5–4)
GLUCOSE SERPL-MCNC: 202 MG/DL (ref 70–99)
GLUCOSE UR QL: 43 MG/DL
HCT VFR BLD CALC: 42.6 % (ref 42–52)
IG%: 0.2 %
IMM GRANULOCYTES NFR BLD AUTO: 34.8 %
KETONES UR QL STRIP: 56 MG/DL
LYMPHOCYTES # BLD: 1.82 K/UL (ref 1.2–3.4)
MCH RBC QN AUTO: 30.8 PG (ref 25–34)
MCHC RBC AUTO-ENTMCNC: 33.3 G/DL (ref 32–36)
MCV RBC AUTO: 92.4 FL (ref 80–100)
MONOCYTES NFR BLD: 7.3 %
NEUTROPHILS # BLD AUTO: 1.5 %
NEUTROPHILS NFR BLD AUTO: 55.8 %
NITRITE UR QL STRIP: 140 MG/DL (ref 0–150)
PH UR: 127 MG/DL (ref 0–200)
PMV BLD AUTO: 11 FL (ref 7.4–10.4)
POTASSIUM SERPL-SCNC: 4.2 MMOL/L (ref 3.5–5.1)
RBC # BLD AUTO: 4.61 M/UL (ref 4.7–6.1)
SODIUM SERPL-SCNC: 139 MMOL/L (ref 136–145)
TSH SERPL-ACNC: 1.68 UIU/ML (ref 0.3–4.5)
VERY LOW DENSITY LIPOPROT CALC: 28 MG/DL
WBC # BLD AUTO: 5.23 K/UL (ref 4.8–10.8)

## 2017-12-12 LAB — EST. AVERAGE GLUCOSE BLD GHB EST-MCNC: 226 MG/DL

## 2017-12-19 ENCOUNTER — HOSPITAL ENCOUNTER (OUTPATIENT)
Dept: HOSPITAL 45 - C.RAD | Age: 72
Discharge: HOME | End: 2017-12-19
Attending: UROLOGY
Payer: COMMERCIAL

## 2017-12-19 DIAGNOSIS — N20.1: Primary | ICD-10-CM

## 2017-12-19 NOTE — DIAGNOSTIC IMAGING REPORT
[facility]



CLINICAL HISTORY: N20.1 Ureteral [id number]    



COMPARISON STUDY:  10/20/2016 



FINDINGS: There is a 2 cm calcification at the L3 level to the right of midline.

This could represent a faintly opaque proximal right ureteral calculus. Also

evident is a 21 mm calcification projected over the medial aspect of the upper

pole the right kidney. Prior studies indicated this represents calcification

within a renal artery aneurysm. There is no pathologic bowel dilatation.



IMPRESSION:  2 cm calcification at the L3 level to the right of midline. This

could represent a faintly opaque proximal right ureteral calculus. If clinically

indicated, CT scanning could be obtained for confirmation.







Electronically signed by:  Zane Mejia M.D.

12/19/2017 11:54 AM



Dictated Date/Time:  12/19/2017 11:52 AM

## 2017-12-26 ENCOUNTER — HOSPITAL ENCOUNTER (OUTPATIENT)
Dept: HOSPITAL 45 - C.CTS | Age: 72
Discharge: HOME | End: 2017-12-26
Attending: UROLOGY
Payer: COMMERCIAL

## 2017-12-26 DIAGNOSIS — N20.0: Primary | ICD-10-CM

## 2017-12-26 DIAGNOSIS — I72.2: ICD-10-CM

## 2017-12-26 NOTE — DIAGNOSTIC IMAGING REPORT
CT SCAN OF THE ABDOMEN AND PELVIS WITHOUT IV CONTRAST



CLINICAL HISTORY:   Nephrolithiasis.



COMPARISON STUDY:  [facility] dated 12/19/2017. Abdominal CT dated 11/12/2015.



TECHNIQUE: CT scan of the abdomen and pelvis is performed from the lung bases to

the proximal femora. Images are reviewed in the axial, sagittal, and coronal

planes. IV contrast was not administered for this examination. A dose lowering

technique was utilized adhering to the principles of ALARA.



CT DOSE: 774.58 mGy.cm



FINDINGS:



Lung bases: The heart is top normal in size and without pericardial effusion.

There are coronary artery calcifications. Subpleural reticulation is present at

both lung bases. There are tiny foci of tree-in-bud nodularity in the lower

lobes. Scattered punctate calcified granulomas are observed. A 4 mm nodule at

the right lung base as seen on image #35. This is unchanged from 2015 and of

doubtful significance. There is a tiny hiatal hernia.



Liver: The unenhanced liver is normal in size, contour, and attenuation. There

is no intrahepatic biliary ductal dilatation.



Gallbladder: Unremarkable.



Spleen: Normal in size and attenuation.



Pancreas: The unenhanced pancreas is grossly unremarkable.



Adrenal glands: Unremarkable.



Kidneys: The unenhanced kidneys demonstrate cortical atrophy and are without

hydronephrosis. There are 2 nonobstructing right renal calculi which measure up

to 5 mm. There are least 4 nonobstructing left renal calculi measuring up to 3

mm. A 2.0 cm cyst is noted in the interpolar right kidney. There is a 1.8 cm

partially calcified right renal artery aneurysm seen on image #152.



Abdominal vasculature: The abdominal aorta is normal in course and caliber

noting moderate atherosclerotic calcification.



Bowel: There is mild colonic diverticulosis without CT evidence of acute

diverticulitis. No bowel obstruction is seen. The appendix is  well-visualized

and normal.



Peritoneum: There is no intraperitoneal free air or abdominal ascites. Foci of

induration within the ventral abdominal wall are likely related to subcutaneous

injections.



Lymphadenopathy: None.



Pelvic viscera: The prostate gland is mildly enlarged and heterogeneous,

measuring 4.6 cm in transverse diameter. The bladder wall is thickened and

trabeculated suggesting chronic outlet obstruction.



Skeletal structures: The skeletal structures are osteopenic. There is mild to

moderate lumbosacral spondylosis. A disc herniation is noted at L4-L5. Arthritic

change is also seen in the hips and sacroiliac joints. No lytic or blastic

lesions are seen.





IMPRESSION:



1. There are small bilateral nonobstructing renal calculi as above. No ureteral

stone is seen and there is no hydronephrosis. The calculus questioned at the

level of the right L3 transverse process by [facility] on 12/19/2017 was likely

artifactual.



2. A 1.8 cm partially calcified right renal artery aneurysm is again noted.



3. Mild colonic diverticulosis without CT evidence of acute diverticulitis.



4. Additional findings as above.







Electronically signed by:  Jason Clemons M.D.

12/26/2017 10:36 AM



Dictated Date/Time:  12/26/2017 10:21 AM

## 2018-02-18 ENCOUNTER — HOSPITAL ENCOUNTER (EMERGENCY)
Dept: HOSPITAL 45 - C.EDA | Age: 73
Discharge: ANOTHER HOSPITAL | End: 2018-02-18
Payer: COMMERCIAL

## 2018-02-18 VITALS — DIASTOLIC BLOOD PRESSURE: 71 MMHG | SYSTOLIC BLOOD PRESSURE: 139 MMHG | OXYGEN SATURATION: 97 %

## 2018-02-18 VITALS
HEIGHT: 69.02 IN | WEIGHT: 225.97 LBS | WEIGHT: 225.97 LBS | HEIGHT: 69.02 IN | BODY MASS INDEX: 33.47 KG/M2 | BODY MASS INDEX: 33.47 KG/M2

## 2018-02-18 VITALS — TEMPERATURE: 98.42 F | OXYGEN SATURATION: 94 %

## 2018-02-18 DIAGNOSIS — E11.9: ICD-10-CM

## 2018-02-18 DIAGNOSIS — Z98.890: ICD-10-CM

## 2018-02-18 DIAGNOSIS — Z79.4: ICD-10-CM

## 2018-02-18 DIAGNOSIS — F40.240: ICD-10-CM

## 2018-02-18 DIAGNOSIS — Z86.73: ICD-10-CM

## 2018-02-18 DIAGNOSIS — I10: ICD-10-CM

## 2018-02-18 DIAGNOSIS — E78.5: ICD-10-CM

## 2018-02-18 DIAGNOSIS — G54.9: Primary | ICD-10-CM

## 2018-02-18 DIAGNOSIS — Z79.02: ICD-10-CM

## 2018-02-18 LAB
BASOPHILS # BLD: 0.02 K/UL (ref 0–0.2)
BASOPHILS NFR BLD: 0.2 %
BUN SERPL-MCNC: 15 MG/DL (ref 7–18)
CALCIUM SERPL-MCNC: 9.3 MG/DL (ref 8.5–10.1)
CO2 SERPL-SCNC: 22 MMOL/L (ref 21–32)
CREAT SERPL-MCNC: 0.96 MG/DL (ref 0.6–1.4)
EOS ABS #: 0.03 K/UL (ref 0–0.5)
EOSINOPHIL NFR BLD AUTO: 131 K/UL (ref 130–400)
GLUCOSE SERPL-MCNC: 260 MG/DL (ref 70–99)
HCT VFR BLD CALC: 33 % (ref 42–52)
HGB BLD-MCNC: 10.5 G/DL (ref 14–18)
IG#: 0.02 K/UL (ref 0–0.02)
IMM GRANULOCYTES NFR BLD AUTO: 16.9 %
LYMPHOCYTES # BLD: 1.38 K/UL (ref 1.2–3.4)
MCH RBC QN AUTO: 29.7 PG (ref 25–34)
MCHC RBC AUTO-ENTMCNC: 31.8 G/DL (ref 32–36)
MCV RBC AUTO: 93.2 FL (ref 80–100)
MONO ABS #: 1 K/UL (ref 0.11–0.59)
MONOCYTES NFR BLD: 12.2 %
NEUT ABS #: 5.73 K/UL (ref 1.4–6.5)
NEUTROPHILS # BLD AUTO: 0.4 %
NEUTROPHILS NFR BLD AUTO: 70.1 %
PMV BLD AUTO: 10.4 FL (ref 7.4–10.4)
POTASSIUM SERPL-SCNC: 4.2 MMOL/L (ref 3.5–5.1)
RED CELL DISTRIBUTION WIDTH CV: 13.3 % (ref 11.5–14.5)
RED CELL DISTRIBUTION WIDTH SD: 45.2 FL (ref 36.4–46.3)
SODIUM SERPL-SCNC: 136 MMOL/L (ref 136–145)
WBC # BLD AUTO: 8.18 K/UL (ref 4.8–10.8)

## 2018-02-18 RX ADMIN — MORPHINE SULFATE PRN MG: 4 INJECTION, SOLUTION INTRAMUSCULAR; INTRAVENOUS at 18:15

## 2018-02-18 RX ADMIN — MORPHINE SULFATE PRN MG: 4 INJECTION, SOLUTION INTRAMUSCULAR; INTRAVENOUS at 19:26

## 2018-02-18 NOTE — EMERGENCY ROOM VISIT NOTE
History


Report prepared by Dannyibcici:  Allegra Elise


Under the Supervision of:  Dr. Raffi Rosado M.D.


First contact with patient:  09:30


Chief Complaint:  BACK PAIN


Stated Complaint:  BACK PAIN





History of Present Illness


The patient is a 72 year old male who presents to the Emergency Room with 

complaints of worsening back pain for the past 2 days. He was brought to the ED 

via ALS. He reports he underwent an L3 to L5 laminectomy 2 ago, on February 16th

, at [facility]. He rates his current discomfort as a 10/

10 in severity. He was placed on Tramadol for pain relief, but states it has 

provided minimal relief. The pain radiates down his bilateral legs. The patient 

was released home yesterday and states he cannot walk because of pain. He 

reports he was never ambulated while at [place] [facility]. He denies any loss of bowel or 

bladder function or saddle anesthesia. He can still move his legs. He denies 

any fevers, chills, abdominal pain, nausea, vomiting or diarrhea. His family 

denies any bleeding or discharge from his surgical dressings. The patient does 

take Plavix, but has been off it since the surgery.





   Source of History:  patient


   Onset:  2 days PTA


   Position:  back


   Symptom Intensity:  10/10


   Timing:  worsening


   Modifying Factors (Worsening):  movement


   Modifying Factors (Relieving):  other (Tramadol)


   Associated Symptoms:  No fevers, No chills, No nausea, No vomiting, No 

abdominal pain, No diarrhea





Review of Systems


See HPI for pertinent positives & negatives. A total of 10 systems reviewed and 

were otherwise negative.





Past Medical & Surgical


Medical Problems:


(1) CVA (cerebral vascular accident)


(2) Diabetes


(3) HTN (hypertension)


(4) Hyperlipemia


(5) Tobacco abuse


Surgical Problems:


(1) H/O: knee surgery








Family History





Patient reports no known family medical history.





Social History


Smoking Status:  Unknown if Ever Smoked


Drug Use:  none


Marital Status:  


Housing Status:  lives with family


Occupation Status:  retired





Current/Historical Medications


Scheduled


Amlodipine (Norvasc), 10 MG PO DAILY


Atorvastatin (Lipitor), 40 MG PO DAILY


Cholecalciferol (Vitamin D-3), 2,000 UNITS PO DAILY


Cyanocobalamin (Vitamin B-12), 1,000 MCG PO DAILY


Insulin Glargine (Toujeo Solostar), 70 UNITS SC HS


Insulin Lispro 75/25 (Humalog Mix 75/25), 17 UNITS SC BID


Losartan Potassium (Cozaar), 100 MG PO DAILY


Metformin Hcl (Glucophage), 500 MG PO BID


Multivitamin (Multivitamin), 1 TAB PO DAILY





Allergies


Coded Allergies:  


     No Known Allergies (Verified , 2/18/18)





Physical Exam


Vital Signs











  Date Time  Temp Pulse Resp B/P (MAP) Pulse Ox O2 Delivery O2 Flow Rate FiO2


 


2/18/18 16:00  87 20 160/85 93 Room Air  


 


2/18/18 15:00  84 20 156/102 96 Room Air 4.0 


 


2/18/18 13:42  91 16 133/76 96 Nasal Cannula 4.0 


 


2/18/18 12:00  74 18 142/82 93 Nasal Cannula 2.0 


 


2/18/18 09:31  75      


 


2/18/18 09:15      Nasal Cannula  


 


2/18/18 09:15     94 Nasal Cannula 2.0 


 


2/18/18 09:15 36.9 79 20 158/86 92 Room Air  











Physical Exam


GENERAL: Patient is uncomfortable appearing and in moderate distress.


HEENT: No acute trauma, normocephalic atraumatic, mucous membranes moist, no 

nasal congestion, no scleral icterus.


NECK: No stridor, no adenopathy, no meningismus, trachea is midline.


LUNGS: No dyspnea. Clear to auscultation and equal bilaterally. No wheeze, no 

rhonchi.


HEART: Regular rate and rhythm.  No murmurs, rubs, gallops appreciated.


ABDOMEN: Soft, nontender, bowel sounds positive, no masses appreciated, no 

peritonitis.


BACK: Lumbar incision scar with sutures, no drainage, some mild surrounding 

bruising, no swelling, no erythema, mild tenderness to palpation of low back 

and buttocks. 


EXTREMITIES: Pain with ROM of legs but no decreased strength or sensation. No 

cyanosis, no edema.


NEUROLOGIC: Alert and oriented, no acute motor or sensory deficits, no focal 

weakness, cranial nerves grossly intact.


SKIN: No rash, no jaundice, no diaphoresis.





Medical Decision & Procedures


ER Provider


Diagnostic Interpretation:


Radiology results and stated below per my review and radiologist interpretation:





MRI OF THE LUMBAR SPINE WITHOUT CONTRAST





CLINICAL HISTORY: post surg 48 hrs, worsening bilateral leg pain/weakness    





COMPARISON STUDY:  No previous studies for comparison. 





TECHNIQUE:  Utilizing a 1.5 Lucy magnet and dedicated coil, multiplanar,


multiecho imaging of the lumbar spine was performed without IV contrast.





FINDINGS:





For purposes of numbering on this exam, the L5-S1 disc space is assigned to


axial image 29 of 31. Alignment of the lumbar spine is anatomic. Vertebral body


heights are maintained. There is no suspicious marrow replacement. The conus


terminates at the lower L1 level. This exam is moderately compromised by motion


artifact. Note is made of an L3-L5 laminectomy. Note is made of a laminectomy


bed fluid collection that measures 3.5 x 2.3 x 1.9 cm. This extends into the


canal and has significant mass effect upon the thecal sac. No additional fluid


collections are identified on this exam. The bladder is partially imaged but


appears to be significantly distended.





L1-2: The central canal and neural foramina patent.





L2-3: The central canal and neural foramen are patent.





L3-4: There is mild disc bulge. There is mild narrowing of the central canal.


There is mild during of both neural foramen.





L4-5: The patient is status post laminectomy. Laminectomy bed fluid collection


is noted with mass effect upon the posterior aspect of the thecal sac. There is


a disc bulge with superimposed central disc protrusion. There is severe


narrowing of the central canal at this level due to these findings. There is


moderate narrowing of both lateral recesses and mild narrowing of both neural


foramen.





L5-S1: Central canal is patent. There is mild bilateral neural foraminal


stenosis.





IMPRESSION: 





1. Status post recent L3-L5 laminectomy. 3.5 x 2.3 x 1.9 cm laminectomy bed


fluid collection which extends into the canal and has significant mass effect


upon the thecal sac. This collection, in conjunction with a disc bulge and


central disc protrusion at L4-L5 result in significant mass effect upon the


thecal sac. The laminectomy bed fluid collection is nonspecific in the early


postoperative setting and differential considerations include seroma,


pseudomeningocele and hematoma (although less likely given the signal


characteristics).


2. Significant distention of the urinary bladder. 


3. Study compromised by motion artifact.





Electronically signed by:  Tanvir Nunez M.D.


2/18/2018 2:07 PM





Laboratory Results


2/18/18 09:19








Red Blood Count 3.54, Mean Corpuscular Volume 93.2, Mean Corpuscular Hemoglobin 

29.7, Mean Corpuscular Hemoglobin Concent 31.8, Mean Platelet Volume 10.4, 

Neutrophils (%) (Auto) 70.1, Lymphocytes (%) (Auto) 16.9, Monocytes (%) (Auto) 

12.2, Eosinophils (%) (Auto) 0.4, Basophils (%) (Auto) 0.2, Neutrophils # (Auto

) 5.73, Lymphocytes # (Auto) 1.38, Monocytes # (Auto) 1.00, Eosinophils # (Auto

) 0.03, Basophils # (Auto) 0.02





2/18/18 09:19

















Test


  2/18/18


09:19


 


White Blood Count


  8.18 K/uL


(4.8-10.8)


 


Red Blood Count


  3.54 M/uL


(4.7-6.1)


 


Hemoglobin


  10.5 g/dL


(14.0-18.0)


 


Hematocrit 33.0 % (42-52) 


 


Mean Corpuscular Volume


  93.2 fL


()


 


Mean Corpuscular Hemoglobin


  29.7 pg


(25-34)


 


Mean Corpuscular Hemoglobin


Concent 31.8 g/dl


(32-36)


 


Platelet Count


  131 K/uL


(130-400)


 


Mean Platelet Volume


  10.4 fL


(7.4-10.4)


 


Neutrophils (%) (Auto) 70.1 % 


 


Lymphocytes (%) (Auto) 16.9 % 


 


Monocytes (%) (Auto) 12.2 % 


 


Eosinophils (%) (Auto) 0.4 % 


 


Basophils (%) (Auto) 0.2 % 


 


Neutrophils # (Auto)


  5.73 K/uL


(1.4-6.5)


 


Lymphocytes # (Auto)


  1.38 K/uL


(1.2-3.4)


 


Monocytes # (Auto)


  1.00 K/uL


(0.11-0.59)


 


Eosinophils # (Auto)


  0.03 K/uL


(0-0.5)


 


Basophils # (Auto)


  0.02 K/uL


(0-0.2)


 


RDW Standard Deviation


  45.2 fL


(36.4-46.3)


 


RDW Coefficient of Variation


  13.3 %


(11.5-14.5)


 


Immature Granulocyte % (Auto) 0.2 % 


 


Immature Granulocyte # (Auto)


  0.02 K/uL


(0.00-0.02)


 


Anion Gap


  10.0 mmol/L


(3-11)


 


Est Creatinine Clear Calc


Drug Dose 82.1 ml/min 


 


 


Estimated GFR (


American) 91.2 


 


 


Estimated GFR (Non-


American 78.7 


 


 


BUN/Creatinine Ratio 15.3 (10-20) 


 


Calcium Level


  9.3 mg/dl


(8.5-10.1)





Laboratory results as reviewed by me.





Medications Administered











 Medications


  (Trade)  Dose


 Ordered  Sig/Sriram


 Route  Start Time


 Stop Time Status Last Admin


Dose Admin


 


 Hydromorphone HCl


  (Dilaudid Inj)  1 mg  NOW  STAT


 IV  2/18/18 09:38


 2/18/18 09:39 DC 2/18/18 10:00


1 MG


 


 Methocarbamol


  (Robaxin Tab)  500 mg  NOW  STAT


 PO  2/18/18 09:45


 2/18/18 09:47 DC 2/18/18 10:30


500 MG


 


 Sodium Chloride  500 ml @ 


 999 mls/hr  Q31M STAT


 IV  2/18/18 10:36


 2/18/18 11:06 DC 2/18/18 11:00


999 MLS/HR


 


 Lorazepam


  (Ativan Inj)  1 mg  Q1H  PRN


 IV  2/18/18 12:00


 2/18/18 14:28 DC 2/18/18 12:25


1 MG


 


 Sodium Chloride  1,000 ml @ 


 75 mls/hr  [id number] STAT


 IV  2/18/18 14:27


 2/19/18 03:46  2/18/18 14:49


75 MLS/HR


 


 Morphine Sulfate


  (MoRPHine


 SULFATE INJ)  4 mg  NOW  STAT


 IV  2/18/18 15:02


 2/18/18 15:03 DC 2/18/18 15:27


4 MG











ED Course


0931: The patient was evaluated in room A10. A complete history and physical 

exam was performed.





0938: Dilaudid 1 mg IV.





0943: I discussed the patients case with Dr. Lila Walsh, [facility] 

Neurosurgery. She recommends the patient get Robaxin. 





0945: Robaxin 500 mg PO. 





0950: I reevaluated the patient. I discussed my conversation with Dr. Walsh and 

they verbalized complete understanding and agreement. 





1036:  ml @ 999 mls/hr IV. 





1040: I reevaluated the patient. He is feeling a little better. He is mildly 

hypoxic on room air. He still has significant pain with ROM of legs. 





1140: Nursing attempted to ambulate the patient. He states he has too much pain 

and it hurts too much to try to walk. 





1147: I discussed the patients case with Dr. Walsh again. She recommends an MRI 

without contrast to evaluate for a hematoma. 





1152: I discussed my conversation with Dr. Walsh with the patient. He is still 

sleepy and states he does not do well with MRIs due to the enclosed space, but 

will try it. 





1200: Ativan 1 mg IV.  





1417: I discussed the patients case with Dr. Walsh again. She requests the 

patient be transferred to [place] for surgical evaluation. She is aware it 

could be several hours to get a bed and another several hours to get an 

ambulance, and it could be late evening until the patient arrives. 





1425: I reevaluated the patient. He is sleepy but notes minimal pain. I advised 

he stay NPO and discussed my conversation with Dr. Walsh and our plan to transfer 

him to [facility] and he verbalized complete understanding and 

agreement.





Medical Decision


Etiologies such as musculoskeletal, disc herniation, fracture, post-surgical 

pain, hemorrhage, aortic disease, metastatic disease, cord compression, discitis

, infection, renal colic, gastrointestinal, acute exacerbation of chronic back 

pain, sciatica, cauda equina, as well as others were entertained.








72 yr old male arrives with low back pain 48 hours post L3-L5 laminectomy.  

Unable to stand/get up due to pain.  After pain control with dilaudid as well 

as Robaxin (per [facility]) patient quite comfortable and on on trying to ambulate 

his legs are found to be much weaker than expected.  He has no urinary loss of 

control, but given leg weakness and recent surgery felt that imaging required.  

Patient required Ativan prior to MRI given severe claustrophobia.  MRI reveals 

significant thecal sac compression from fluid collection.  Discussed this with 

Dr Walsh [facility] who feels transfer to their facility reasonable.  Notes 

pain control while waiting and hold off on steroids.  Aware could be many hours 

prior to arrival given distant, no current bed, need to get ambulance, etc.  

Pain is controllable with Morphine IV throughout stay.  Will send with PRN 

Morphine, Ativan, Zofran orders on ALS.





Medication Reconcilliation


Current Medication List:  was personally reviewed by me





Blood Pressure Screening


Patient's blood pressure:  Elevated blood pressure


Blood pressure disposition:  Elevated BP felt to be situational





Consults


Time Called:  0937


Consulting Physician:  Dr. Lila Walsh, [facility] Neuro[facility]


Returned Call:  0931


I discussed the patients case with Dr. Lila Walsh, [facility] 

Neurosurgery. She recommends the patient get Robaxin.





Impression





 Primary Impression:  


 Nerve compression


 Additional Impression:  


 Lumbar spine root compression





Scribe Attestation


The scribe's documentation has been prepared under my direction and personally 

reviewed by me in its entirety. I confirm that the note above accurately 

reflects all work, treatment, procedures, and medical decision making performed 

by me.





Departure Information


Dispostion


Transfer Acute Care Facility (The patient has been accepted as a transfer to 

[facility])





Referrals


Vishnu Hastings M.D. (PCP)





Patient Instructions


My [facility]





Problem Qualifiers

## 2018-02-18 NOTE — DIAGNOSTIC IMAGING REPORT
MRI OF THE LUMBAR SPINE WITHOUT CONTRAST



CLINICAL HISTORY: post surg 48 hrs, worsening bilateral leg pain/weakness    



COMPARISON STUDY:  No previous studies for comparison. 



TECHNIQUE:  Utilizing a 1.5 Lucy magnet and dedicated coil, multiplanar,

multiecho imaging of the lumbar spine was performed without IV contrast.





FINDINGS:



For purposes of numbering on this exam, the L5-S1 disc space is assigned to

axial image 29 of 31. Alignment of the lumbar spine is anatomic. Vertebral body

heights are maintained. There is no suspicious marrow replacement. The conus

terminates at the lower L1 level. This exam is moderately compromised by motion

artifact. Note is made of an L3-L5 laminectomy. Note is made of a laminectomy

bed fluid collection that measures 3.5 x 2.3 x 1.9 cm. This extends into the

canal and has significant mass effect upon the thecal sac. No additional fluid

collections are identified on this exam. The bladder is partially imaged but

appears to be significantly distended.



L1-2: The central canal and neural foramina patent.



L2-3: The central canal and neural foramen are patent.



L3-4: There is mild disc bulge. There is mild narrowing of the central canal.

There is mild during of both neural foramen.



L4-5: The patient is status post laminectomy. Laminectomy bed fluid collection

is noted with mass effect upon the posterior aspect of the thecal sac. There is

a disc bulge with superimposed central disc protrusion. There is severe

narrowing of the central canal at this level due to these findings. There is

moderate narrowing of both lateral recesses and mild narrowing of both neural

foramen.



L5-S1: Central canal is patent. There is mild bilateral neural foraminal

stenosis.





IMPRESSION: 



1. Status post recent L3-L5 laminectomy. 3.5 x 2.3 x 1.9 cm laminectomy bed

fluid collection which extends into the canal and has significant mass effect

upon the thecal sac. This collection, in conjunction with a disc bulge and

central disc protrusion at L4-L5 result in significant mass effect upon the

thecal sac. The laminectomy bed fluid collection is nonspecific in the early

postoperative setting and differential considerations include seroma,

pseudomeningocele and hematoma (although less likely given the signal

characteristics).



2. Significant distention of the urinary bladder. 



3. Study compromised by motion artifact.







Electronically signed by:  Tanvir Nunez M.D.

2/18/2018 2:07 PM



Dictated Date/Time:  2/18/2018 1:55 PM

## 2018-03-08 ENCOUNTER — HOSPITAL ENCOUNTER (OUTPATIENT)
Dept: HOSPITAL 45 - C.LABPBG | Age: 73
Discharge: HOME | End: 2018-03-08
Attending: OPHTHALMOLOGY
Payer: COMMERCIAL

## 2018-03-08 DIAGNOSIS — I63.9: Primary | ICD-10-CM

## 2018-03-08 LAB
BUN SERPL-MCNC: 16 MG/DL (ref 7–18)
CREAT SERPL-MCNC: 1.21 MG/DL (ref 0.6–1.4)

## 2018-03-12 ENCOUNTER — HOSPITAL ENCOUNTER (OUTPATIENT)
Dept: HOSPITAL 45 - C.MRIBC | Age: 73
Discharge: HOME | End: 2018-03-12
Attending: OPHTHALMOLOGY
Payer: COMMERCIAL

## 2018-03-12 DIAGNOSIS — I63.9: Primary | ICD-10-CM

## 2018-03-12 NOTE — DIAGNOSTIC IMAGING REPORT
MRI OF THE BRAIN WITHOUT AND WITH IV CONTRAST



CLINICAL HISTORY: Left sided cerebrovascular accident. Stroke symptoms. Loss of

peripheral vision of right eye.    



COMPARISON STUDY:  Head CT and MRI of the brain October 9, 2017. 



TECHNIQUE:  Utilizing a 1.5 Lucy magnet and dedicated coil, multiplanar,

multiecho imaging of the brain was performed pre and postcontrast

administration.  IV administration of 9.5 mL of Gadavist contrast was

uneventful.



FINDINGS: Note is made of a 4 x 3.1 x 4.4 cm focus of inherent T1 hyperintensity

within the left occipital lobe which is new since MRI of October 9, 2017. This

may have mild enhancement however, this demonstrates significant inherent T1

hyperintensity which suggest blood products. There is moderate associated

vasogenic edema with mild mass effect including sulcal effacement. There is no

evidence for herniation. This focus demonstrates mild increased signal intensity

on the diffusion-weighted sequence which could be artifactual given suspected

hemorrhage.  An old left cerebellar infarct is noted. Ventricular system is

stable. Basilar cisterns are patent. There are no extra-axial collections.

Flow-voids for the major intracranial vessels are present. Incidental note is

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



IMPRESSION:  



4. 4 x 4 x 3.1 cm focus of inherent T1 hyperintensity within the left occipital

lobe with moderate associated vasogenic edema and mild mass effect with sulcal

effacement. The signal characteristics suggests a hematoma. This may reflect

hemorrhagic conversion of an infarct. A short-term follow-up head CT in one to 2

days is recommended to confirm hemorrhage and evaluate for interval increase.

This should be followed to resolution to exclude the unlikely possibility of an

underlying mass. Discussed with Dr. Prieto at time of dictation.







Electronically signed by:  Tanvir Nunez M.D.

3/12/2018 12:00 PM



Dictated Date/Time:  3/12/2018 11:38 AM

## 2018-03-13 ENCOUNTER — HOSPITAL ENCOUNTER (OUTPATIENT)
Dept: HOSPITAL 45 - C.LAB | Age: 73
Discharge: HOME | End: 2018-03-13
Attending: PSYCHIATRY & NEUROLOGY
Payer: COMMERCIAL

## 2018-03-13 DIAGNOSIS — I61.9: ICD-10-CM

## 2018-03-13 DIAGNOSIS — E11.9: Primary | ICD-10-CM

## 2018-03-13 LAB
BUN SERPL-MCNC: 15 MG/DL (ref 7–18)
CREAT SERPL-MCNC: 1.07 MG/DL (ref 0.6–1.4)

## 2018-03-14 ENCOUNTER — HOSPITAL ENCOUNTER (OUTPATIENT)
Dept: HOSPITAL 45 - C.CTS | Age: 73
Discharge: HOME | End: 2018-03-14
Attending: PSYCHIATRY & NEUROLOGY
Payer: COMMERCIAL

## 2018-03-14 DIAGNOSIS — I61.9: Primary | ICD-10-CM

## 2018-03-14 NOTE — DIAGNOSTIC IMAGING REPORT
NECK CTA



HISTORY: Hemorrhagic stroke.  Follow-up.



TECHNIQUE: Multiaxial CT images of the neck were performed following the

intravenous administration of contrast to evaluate the major cervical vessels.

Maximum intensity projection images were also obtained. All measurements were

calculated based on NASCET criteria.  A dose lowering technique was utilized

adhering to the principles of ALARA.



COMPARISON STUDY:  Neck CTA 10/10/2017.



FINDINGS: There is dilatation of visualized portions of the ascending aorta

which measures approximately 4.5 cm in caliber. The caliber of the aortic arch

is normal. There is mild atherosclerotic plaque of the aortic arch. The

bilateral common carotid, internal carotid and vertebral arteries are patent.

There is no significant stenosis within these vessels and there is no evidence

for dissection. There is mild atherosclerotic plaque within the proximal

bilateral internal carotid arteries. The right vertebral artery is dominant and

patent. The left vertebral artery is also patent. An old left cerebellar infarct

is noted within visual portions of the brain. Lung apices are clear. There is no

cervical lymphadenopathy.



IMPRESSION: 





1. Unremarkable CTA of the neck for age. No significant stenosis within the

bilateral common carotid, internal carotid or vertebral arteries. Mild

atherosclerotic plaque. No dissection.



2. Dilatation of visualized portions of the ascending aorta, measuring

approximately 4.5 cm.







Electronically signed by:  Sukhwinder Linder M.D.

3/14/2018 9:39 AM



Dictated Date/Time:  3/14/2018 9:32 AM

## 2018-03-14 NOTE — DIAGNOSTIC IMAGING REPORT
ANGIOGRAPHY HEAD COMBO



CLINICAL HISTORY:  72 years-old Male with follow-up study to assess a

hemorrhagic process of the left occipital lobe.      



COMPARISON STUDY:  Brain MRI 3/12/2018, CT head 10/9/2017



TECHNIQUE: Unenhanced axial CT scan of the brain is performed. Subsequently,

following the IV administration of 120 cc of Optiray 320, CT angiogram of the

brain was performed from the skull base to the vertex. Images are reviewed in

the axial, sagittal, and coronal planes. 3-D MIPS images are created and

assessed. IV contrast was administered without complication.  A dose lowering

technique was utilized adhering to the principles of ALARA.



CT DOSE: 1300.50 mGy.cm



FINDINGS:



CT BRAIN: 

Intraparenchymal hematoma involving the left occipital lobe is redemonstrated

measuring up to 4.2 x 2.3 cm, previously measuring 4.4 x 2.7 cm on study dated

3/12/2018 when measured in a similar fashion. Moderate surrounding vasogenic

edema with mild mass effect and sulcal effacement redemonstrated. There is no

midline shift or herniation identified. Moderate atrophy with ill-defined areas

of low-attenuation within the ventricular white matter suggesting chronic

microvascular ischemic changes. No extra-axial collections identified. No

hydrocephalus. Remote infarction of the left mid cerebellar hemisphere.



 Mastoid air cells and middle ear cavities are clear. No calvarial fracture.

Paranasal sinuses are clear. 





CT ANGIOGRAM OF THE BRAIN: 

The imaged bilateral internal carotid arteries are patent. There is mild

atherosclerosis of the bilateral cavernous and clinoid portions of the internal

carotid arteries bilaterally without high-grade narrowing. The bilateral

anterior and middle cerebral arteries are also patent. The vertebrobasilar

system and posterior cerebral arteries are widely patent. There is no aneurysm,

high-grade stenosis, or proximal branch occlusion identified. Dural sinuses

appear patent. There is no vascular malformation identified within the left

occipital lobe. Previous described small development of venous anomaly of the

right parietal lobe is better seen on comparison MRI with contrast. No evidence

of active extravasation of the left occipital lobe.





IMPRESSION: 

1. Intraparenchymal hematoma of the left occipital lobe appears stable to

slightly decreased in size from comparison study dated 3/12/2018 again

demonstrating moderate vasogenic edema with associated mild mass effect and

sulcal effacement. No midline shift, intraventricular extension or

hydrocephalus. Continued follow-up recommended.

2. Unremarkable CTA of the head without aneurysm, high-grade stenosis, proximal

branch occlusion or dissection. No vascular malformation identified within the

left occipital lobe. Previously described small developmental venous anomaly of

the right parietal lobe is better seen on comparison MRI.

3. Mild atrophy with chronic microvascular ischemic changes and remote left

cerebellar hemisphere infarction.







The above report was generated using voice recognition software. It may contain

grammatical, syntax or spelling errors.







Electronically signed by:  Gerry Fierro M.D.

3/14/2018 9:05 AM



Dictated Date/Time:  3/14/2018 8:53 AM

## 2018-03-27 ENCOUNTER — HOSPITAL ENCOUNTER (OUTPATIENT)
Dept: HOSPITAL 45 - C.CTS | Age: 73
Discharge: HOME | End: 2018-03-27
Attending: PSYCHIATRY & NEUROLOGY
Payer: COMMERCIAL

## 2018-03-27 DIAGNOSIS — I61.9: Primary | ICD-10-CM

## 2018-03-27 NOTE — DIAGNOSTIC IMAGING REPORT
CT SCAN OF THE BRAIN WITHOUT IV CONTRAST



CLINICAL HISTORY: Follow-up hemorrhagic infarct.



COMPARISON STUDY:  CT of the brain dated 3/14/2018.



TECHNIQUE: Unenhanced axial CT scan of the brain is performed from the vertex to

the skull base. A dose lowering technique was utilized adhering to the

principles of ALARA. 



CT DOSE: 788.63 mGycm



FINDINGS:



Brain parenchyma: There is edema and developing encephalomalacia identified in

the left occipital lobe at the site of the previously characterized hemorrhage.

Hyperdense blood products have almost completely resolved. There are age-related

involutional changes noting  mild subcortical and periventricular

microangiopathic change. Left cerebellar encephalomalacia is consistent with a

remote infarct. A chronic lacunar infarct is seen in the left internal capsule.

No new hemorrhage is identified. There is no mass effect or evidence of acute

territorial ischemia by CT criteria. Gray-white matter is preserved. No

extra-axial fluid collection is seen.



Ventricles, sulci, cisterns: Prominent secondary to involutional change.



Intracranial vasculature: There is atherosclerotic calcification of the

cavernous carotid and vertebral arteries.



Calvarium: Unremarkable.



Sinuses and mastoids: The visualized paranasal sinuses are clear. The mastoid

air cells are well pneumatized.



Orbits: The bony orbits are grossly intact.





IMPRESSION:



1. There is an evolving left occipital infarct as above. Hyperdense blood

products have almost completely resolved as compared to 3/14/2018.



2. No new foci of hemorrhage are identified.







Electronically signed by:  Jason Clemons M.D.

3/27/2018 11:28 AM



Dictated Date/Time:  3/27/2018 11:25 AM

## 2018-03-29 ENCOUNTER — HOSPITAL ENCOUNTER (OUTPATIENT)
Dept: HOSPITAL 45 - C.LABPBG | Age: 73
Discharge: HOME | End: 2018-03-29
Attending: NURSE PRACTITIONER
Payer: COMMERCIAL

## 2018-03-29 DIAGNOSIS — E11.9: Primary | ICD-10-CM

## 2018-03-29 LAB — CREAT UR-MCNC: 109 MG/DL

## 2018-03-30 LAB — HBA1C MFR BLD: 8.1 % (ref 4.5–5.6)

## 2018-05-05 ENCOUNTER — HOSPITAL ENCOUNTER (EMERGENCY)
Dept: HOSPITAL 45 - C.EDB | Age: 73
Discharge: HOME | End: 2018-05-05
Payer: COMMERCIAL

## 2018-05-05 VITALS — TEMPERATURE: 97.88 F

## 2018-05-05 VITALS
HEIGHT: 69.02 IN | WEIGHT: 207.23 LBS | HEIGHT: 69.02 IN | BODY MASS INDEX: 30.69 KG/M2 | BODY MASS INDEX: 30.69 KG/M2 | WEIGHT: 207.23 LBS

## 2018-05-05 VITALS — DIASTOLIC BLOOD PRESSURE: 82 MMHG | HEART RATE: 56 BPM | OXYGEN SATURATION: 98 % | SYSTOLIC BLOOD PRESSURE: 145 MMHG

## 2018-05-05 DIAGNOSIS — Z79.84: ICD-10-CM

## 2018-05-05 DIAGNOSIS — M79.661: ICD-10-CM

## 2018-05-05 DIAGNOSIS — E11.9: ICD-10-CM

## 2018-05-05 DIAGNOSIS — Z79.4: ICD-10-CM

## 2018-05-05 DIAGNOSIS — Z79.01: ICD-10-CM

## 2018-05-05 DIAGNOSIS — Z79.899: ICD-10-CM

## 2018-05-05 DIAGNOSIS — E78.5: ICD-10-CM

## 2018-05-05 DIAGNOSIS — I10: ICD-10-CM

## 2018-05-05 DIAGNOSIS — Z96.651: ICD-10-CM

## 2018-05-05 DIAGNOSIS — Z86.73: ICD-10-CM

## 2018-05-05 DIAGNOSIS — M54.31: Primary | ICD-10-CM

## 2018-05-05 NOTE — EMERGENCY ROOM VISIT NOTE
History


First contact with patient:  09:32


Chief Complaint:  LEG PAIN,LEG INJURY


Stated Complaint:  PAIN IN LOWER RT LEG,CANT WALK





History of Present Illness


The patient is a 72 year old male who presents to the Emergency Room with 

complaints of right leg pain.  The patient reports that he noticed discomfort 

in his right calf and heel yesterday.  This morning, he reports that the pain 

is more in the calf, back of the knee and thigh.  The patient reports that he 

is currently on Plavix for history of CVA.  He also underwent a lumbar 

laminectomy in February 2018 at [facility].  The patient just 

returned from [place] for a follow-up with his spine surgeon.  Patient also 

reports a prior history of right knee replacement by Dr. Laguna.  He has had 

some anterior knee pain as well.  He denies any recent trauma to the right 

lower extremity.  He rates his discomfort an 8 out of 10.  The patient reports 

that he has taken Tylenol without relief.  He is requesting something stronger 

for the pain.





Review of Systems


10 system review was performed and was negative except for pertinent positives 

and negatives as indicated in history of present illness





Past Medical/Surgical History


Medical Problems:


(1) CVA (cerebral vascular accident)


(2) Diabetes


(3) HTN (hypertension)


(4) Hyperlipemia


(5) Tobacco abuse


Surgical Problems:


(1) H/O: knee surgery


(2) History of lumbar surgery








Family History





Patient reports no known family medical history.





Social History


Smoking Status:  Never Smoker


Alcohol Use:  none


Drug Use:  none


Marital Status:  


Housing Status:  lives with family


Occupation Status:  retired





Current/Historical Medications


Scheduled


Amlodipine (Norvasc), 10 MG PO DAILY


Atorvastatin (Lipitor), 40 MG PO DAILY


Cholecalciferol (Vitamin D-3), 2,000 UNITS PO DAILY


Clopidogrel (Plavix), 75 MG PO DAILY


Cyanocobalamin (Vitamin B-12), 1,000 MCG PO DAILY


Insulin Glargine (Toujeo Solostar), 55 UNITS SC HS


Insulin Lispro 75/25 (Humalog Mix 75/25), 12-17 UNITS SC BID


Losartan Potassium (Cozaar), 100 MG PO DAILY


Metformin Hcl (Glucophage), 500 MG PO BID


Multivitamin (Multivitamin), 1 TAB PO DAILY





Scheduled PRN


Hydrocodone/Acetaminophen 5MG/325MG (Norco 5MG/325MG), 1-2 TABLET PO Q4H PRN 

for Pain





Physical Exam


Vital Signs











  Date Time  Temp Pulse Resp B/P (MAP) Pulse Ox O2 Delivery O2 Flow Rate FiO2


 


5/5/18 09:38 36.6 52 16 161/82 93 Room Air  











Physical Exam


CONSTITUTIONAL:  Healthy and well nourished.  Alert and oriented X 3 with 

positive affect.  Patient appears in mild to moderate discomfort.


HEENT:  Normocephalic, atraumatic.  Pupils equal, round and reactive.  


NECK:  Full active range of motion without discomfort.


RESPIRATORY:  Clear to auscultation bilaterally with no wheezing, crackles, 

rhonchi or stridor.


CARDIOVASCULAR:  Regular rate and rhythm with no murmurs, rubs or gallops.


GASTROINTESTINAL:  Bowel sounds present in all quadrants.  Soft and nontender 

to palpation.


MUSCULOSKELETAL: Examination does not show any obvious discomfort to palpation 

of the posterior thigh, knee, calf or ankle.  Positive straight leg raise.  

Negative logroll.  No joint effusion of the right knee noted, and no 

significant worsening knee pain with range of motion of the knee.  Pedal pulses 

are intact.


INTEGUMENTARY:  No rash or other significant dermatologic conditions noted.  

There is no soft tissue edema, ecchymosis, erythema or increased warmth to 

palpation of the right lower extremity.


NEUROLOGIC:  Cranial nerves II-XII grossly intact.  No focal neurologic 

deficits noted.  Right foot and toes are sensory intact.





Medical Decision & Procedures


ER Provider


Diagnostic Interpretation:


My interpretation of right knee x-rays does not show any obvious fractures or 

dislocations.  There is also no evidence for hardware displacement or bony 

cement disease.  Radiologist report is as follows:





R KNEE 3 VIEWS





CLINICAL HISTORY: 72 years-old Male presenting with R knee pain. 





TECHNIQUE: Frontal, lateral, and sunrise views of the right knee were obtained. 





COMPARISON: 11/6/2012.





FINDINGS:


Postsurgical changes of total right knee arthroplasty with patellar resurfacing.


No patellar subluxation. No gross evidence of a knee joint effusion. No hardware


complication. No acute fracture or malalignment. No advanced degenerative


change. No radiographic soft tissue abnormality.





IMPRESSION:


Expected appearance of the total right knee arthroplasty with patellar


resurfacing. No hardware complication. No acute osseous injury.





==================================================





Venous ultrasound of the right lower extremity does not show any evidence for 

deep vein thrombosis.  Radiologist report is as follows:





R VENOUS DOPP LOWER EXT UNILAT





CLINICAL HISTORY: 72 years-old Male presenting with RLE pain - eval possible


DVT. 





TECHNIQUE: Real-time grayscale and color and spectral Doppler ultrasound imaging


of the veins of the right lower extremity was performed. Compression and


augmentation were also utilized.





COMPARISON: None.





FINDINGS: 





Right:


Common femoral vein: Patent.


Greater saphenous vein: Patent.


Deep femoral vein: Patent.


Femoral vein: Patent.


Popliteal vein: Patent.


Calf veins: Patent.





Other: None.





IMPRESSION:


No evidence of deep venous thrombosis.





Medications Administered











 Medications


  (Trade)  Dose


 Ordered  Sig/Sriram


 Route  Start Time


 Stop Time Status Last Admin


Dose Admin


 


 Acetaminophen/


 Hydrocodone Bitart


  (Norco 5/325 Tab)  1 tab  ONE  STAT


 PO  5/5/18 09:54


 5/5/18 09:55 DC 5/5/18 10:06


1 TAB











ED Course


Patient history and physical exam were performed.  Nurse's notes were reviewed.

  Vital signs were reviewed and were normal.  The patient did request something 

for pain, but refuses oxycodone.  His wife is also concerned about hydrocodone.

  I did discuss other alternatives, including Tylenol, Ultram and Tylenol with 

Codeine.  The patient requested whichever one was stronger.  I did suggest 

trying Norco, and the patient and wife are in agreement.  The patient was 

administered Norco 5/325.  Venous ultrasound of the right lower extremity does 

not show any evidence for DVT.  X-rays of the right knee were also normal.





The patient was also evaluated by Dr. Davis, ED attending physician, who agrees 

with workup and plan of care.  The patient was provided a prescription for 

Norco.  He was instructed to follow-up with his PCP for further reevaluation 

and management.  The patient was encouraged to avoid sitting for long periods 

of time.  He was instructed to return to the emergency department for any 

progressively worsening pain or skin changes of the right lower extremity.  The 

patient was happy with plan of care, voiced understanding of all discharge 

instructions, and rated his pain a 5 out of 10 at the time of discharge with 

his wife.





Medical Decision


History and physical exam findings are most consistent with an acute sciatica.  

Patient does not have any evidence for DVT on ultrasound.  He has no skin 

changes consistent with soft tissue trauma or cellulitis.





[place] Drug Monitoring Program


Search Results:  patient reviewed within database, no issues identified





Medication Reconcilliation


Current Medication List:  was personally reviewed by me





Blood Pressure Screening


Patient's blood pressure:  Normal blood pressure





Impression





 Primary Impression:  


 Right sided sciatica





Departure Information


Prescriptions





Hydrocodone/Acetaminophen 5MG/325MG (Norco 5MG/325MG)  Tab


1-2 TABLET PO Q4H Y for Pain, #15 TAB


   For Initial Treatment


   Prov: Dillan Villanueva PA         5/5/18





Referrals


Vishnu Hastings M.D. (PCP)





Patient Instructions


My [facility]

## 2018-05-05 NOTE — DIAGNOSTIC IMAGING REPORT
R VENOUS DOPP LOWER EXT UNILAT



CLINICAL HISTORY: 72 years-old Male presenting with RLE pain - eval possible

DVT. 



TECHNIQUE: Real-time grayscale and color and spectral Doppler ultrasound imaging

of the veins of the right lower extremity was performed. Compression and

augmentation were also utilized.



COMPARISON: None.



FINDINGS: 



Right:

Common femoral vein: Patent.

Greater saphenous vein: Patent.

Deep femoral vein: Patent.

Femoral vein: Patent.

Popliteal vein: Patent.

Calf veins: Patent.



Other: None.



IMPRESSION:

No evidence of deep venous thrombosis.







Electronically signed by:  Syd Tirado M.D.

5/5/2018 11:27 AM



Dictated Date/Time:  5/5/2018 11:25 AM

## 2018-05-05 NOTE — DIAGNOSTIC IMAGING REPORT
R KNEE 3 VIEWS



CLINICAL HISTORY: 72 years-old Male presenting with R knee pain. 



TECHNIQUE: Frontal, lateral, and sunrise views of the right knee were obtained. 



COMPARISON: 11/6/2012.



FINDINGS:

Postsurgical changes of total right knee arthroplasty with patellar resurfacing.

No patellar subluxation. No gross evidence of a knee joint effusion. No hardware

complication. No acute fracture or malalignment. No advanced degenerative

change. No radiographic soft tissue abnormality.



IMPRESSION:

Expected appearance of the total right knee arthroplasty with patellar

resurfacing. No hardware complication. No acute osseous injury.







Electronically signed by:  Syd Tirado M.D.

5/5/2018 11:12 AM



Dictated Date/Time:  5/5/2018 11:10 AM

## 2018-05-05 NOTE — EMERGENCY ROOM VISIT NOTE
ED Visit Note


First contact with patient:  09:32


This Patient was discussed with the physician assistant, Jenaro Villanueva PA-C.  

The pertinent historical and physical exam findings were confirmed.  I agree 

with the studies ordered and with the interpretations of these studies.  I 

agree with the disposition and care plan.

## 2018-05-17 ENCOUNTER — HOSPITAL ENCOUNTER (OUTPATIENT)
Dept: HOSPITAL 45 - C.MRIBC | Age: 73
Discharge: HOME | End: 2018-05-17
Attending: INTERNAL MEDICINE
Payer: COMMERCIAL

## 2018-05-17 DIAGNOSIS — M99.73: ICD-10-CM

## 2018-05-17 DIAGNOSIS — M54.16: Primary | ICD-10-CM

## 2018-05-17 NOTE — DIAGNOSTIC IMAGING REPORT
LUMBAR SPINE W/O CONTRAST



CLINICAL HISTORY: 72 years-old Male presenting with recent L3 and L5

laminectomy, pain since surgery, unable to walk, bilateral leg weakness, some

arm weakness, discharged yesterday. 



TECHNIQUE: Multisequence, multiplanar MR imaging of the lumbar spine was

performed without the use of intravenous contrast. IV contrast: None. 



COMPARISON: 2/18/2018.



FINDINGS:



Localizer images: Bladder wall thickening may indicate chronic outlet

obstruction.



Postsurgical changes of L3-L5 laminectomies. The operative bed demonstrates

resolution of the previously noted fluid collection. There is granulation tissue

as well as scant residual fluid along the thecal sac (series 5 image 21; series

2 image 8). No focal fluid collection within the superficial incision site.

Adequate posterior decompression at the operative levels.



Normal lumbar lordosis. Vertebral bodies maintain normal height, alignment, and

bone marrow signal intensity. Disc desiccation without significant height loss

noted from L3-4 through L5-S1. Multilevel degenerative changes further detailed

below:



L1-2: No significant neural foraminal or spinal canal narrowing.



L2-3: No significant neural foraminal or spinal canal narrowing despite the

presence of facet arthropathy.



L3-4: Minimal disc bulge and facet arthropathy result in mild bilateral neural

foraminal narrowing. No significant spinal canal narrowing.



L4-5: Disc bulge is similar to prior exam with effacement of the anterior thecal

sac and in combination with facet arthropathy resulting in moderate bilateral

neural foraminal narrowing.



L5-S1: Minimal disc bulge. This results in moderate right and mild left neural

foraminal narrowing. No significant spinal canal narrowing.



Spinal cord ends in good position above the level of L1. Cauda equina normal

morphology. Postsurgical changes in the paraspinal soft tissues as mentioned

above. Remaining soft tissues within normal limits.



IMPRESSION:

1.  Postsurgical changes of L3-L5 laminectomies with near complete resolution of

the previously noted epidural fluid collection in the laminectomy bed. Expected

postsurgical changes with adequate posterior decompression.



2.  Mild degenerative changes with multilevel neural foraminal narrowing as

detailed above most significant at L4-5. These findings have not changed

significantly since the prior exam.







Electronically signed by:  Syd Tirado M.D.

5/17/2018 3:05 PM



Dictated Date/Time:  5/17/2018 2:56 PM

## 2018-08-03 ENCOUNTER — HOSPITAL ENCOUNTER (OUTPATIENT)
Dept: HOSPITAL 45 - C.LABPBG | Age: 73
Discharge: HOME | End: 2018-08-03
Attending: NURSE PRACTITIONER
Payer: COMMERCIAL

## 2018-08-03 DIAGNOSIS — I10: Primary | ICD-10-CM

## 2018-08-03 DIAGNOSIS — D64.9: ICD-10-CM

## 2018-08-03 DIAGNOSIS — E11.9: ICD-10-CM

## 2018-08-03 LAB
BASOPHILS # BLD: 0.03 K/UL (ref 0–0.2)
BASOPHILS NFR BLD: 0.6 %
BUN SERPL-MCNC: 16 MG/DL (ref 7–18)
CALCIUM SERPL-MCNC: 10.2 MG/DL (ref 8.5–10.1)
CO2 SERPL-SCNC: 24 MMOL/L (ref 21–32)
CREAT SERPL-MCNC: 1.12 MG/DL (ref 0.6–1.4)
EOS ABS #: 0.18 K/UL (ref 0–0.5)
EOSINOPHIL NFR BLD AUTO: 176 K/UL (ref 130–400)
GLUCOSE SERPL-MCNC: 98 MG/DL (ref 70–99)
HBA1C MFR BLD: 8.1 % (ref 4.5–5.6)
HCT VFR BLD CALC: 41.8 % (ref 42–52)
HGB BLD-MCNC: 13.5 G/DL (ref 14–18)
IG#: 0 K/UL (ref 0–0.02)
IMM GRANULOCYTES NFR BLD AUTO: 45.7 %
LYMPHOCYTES # BLD: 2.41 K/UL (ref 1.2–3.4)
MCH RBC QN AUTO: 30.1 PG (ref 25–34)
MCHC RBC AUTO-ENTMCNC: 32.3 G/DL (ref 32–36)
MCV RBC AUTO: 93.3 FL (ref 80–100)
MONO ABS #: 0.4 K/UL (ref 0.11–0.59)
MONOCYTES NFR BLD: 7.6 %
NEUT ABS #: 2.25 K/UL (ref 1.4–6.5)
NEUTROPHILS # BLD AUTO: 3.4 %
NEUTROPHILS NFR BLD AUTO: 42.7 %
PMV BLD AUTO: 10.7 FL (ref 7.4–10.4)
POTASSIUM SERPL-SCNC: 4 MMOL/L (ref 3.5–5.1)
RED CELL DISTRIBUTION WIDTH CV: 13.9 % (ref 11.5–14.5)
RED CELL DISTRIBUTION WIDTH SD: 47.6 FL (ref 36.4–46.3)
SODIUM SERPL-SCNC: 142 MMOL/L (ref 136–145)
WBC # BLD AUTO: 5.27 K/UL (ref 4.8–10.8)

## 2018-08-09 ENCOUNTER — HOSPITAL ENCOUNTER (OUTPATIENT)
Dept: HOSPITAL 45 - X.SURG | Age: 73
Discharge: HOME | End: 2018-08-09
Attending: PHYSICAL MEDICINE & REHABILITATION
Payer: COMMERCIAL

## 2018-08-09 VITALS
HEART RATE: 60 BPM | SYSTOLIC BLOOD PRESSURE: 132 MMHG | TEMPERATURE: 97.7 F | OXYGEN SATURATION: 96 % | DIASTOLIC BLOOD PRESSURE: 66 MMHG

## 2018-08-09 VITALS
WEIGHT: 210.43 LBS | BODY MASS INDEX: 31.17 KG/M2 | HEIGHT: 69.02 IN | HEIGHT: 69.02 IN | WEIGHT: 210.43 LBS | BODY MASS INDEX: 31.17 KG/M2

## 2018-08-09 DIAGNOSIS — E11.9: ICD-10-CM

## 2018-08-09 DIAGNOSIS — Z96.653: ICD-10-CM

## 2018-08-09 DIAGNOSIS — M54.18: Primary | ICD-10-CM

## 2018-08-09 DIAGNOSIS — M51.27: ICD-10-CM

## 2018-08-09 DIAGNOSIS — Z79.4: ICD-10-CM

## 2018-08-09 NOTE — DISCHARGE INSTRUCTIONS
Discharge Instructions


Date of Service


Aug 9, 2018.





Visit


Reason for Visit:  Sacral & Sacrococcygeal Radiculopathy





Discharge


Discharge Diagnosis / Problem:  leg pain





Discharge Goals


Goal(s):  Decrease discomfort, Improve function





Activity Recommendations


Activity Limitations:  resume your previous activity





Anesthesia


.





Post Anesthesia Instructions:





If you have had General Anesthesia or IV Sedation:





*  Do not drive today.


*  Resume driving when surgeon permits.


*  Do not make important decisions or sign legal documents today.


*  Call surgeon for:





   1.  Temperature elevations greater than 101 degrees F.


   2.  Uncontrollable pain.


   3.  Excessive bleeding.


   4.  Persistent nausea and vomiting.


   5.  Medication intolerance (nausea, vomiting or rash).





*  For nausea and vomiting use only clear liquids such as: tea, soda, bouillon 

until nausea subsides, then gradually increase diet as tolerated.





*  If you have any concerns or questions, call your surgeon's office.  If 

physician is unavailable and it is an emergency, call 911 or go to the nearest 

emergency room.





.





Diet Recommendations


Recommended Home Diet:  no limitations





Procedures


Procedures Performed:  


CAUDAL EPIDURAL STEROID INJECTION.





Pending Studies


Studies pending at discharge:  no





Medical Emergencies








.


Who to Call and When:





Medical Emergencies:  If at any time you feel your situation is an emergency, 

please call 911 immediately.





.





Non-Emergent Contact


Non-Emergency issues call your:  Specialist





.


.








"Provider Documentation" section prepared by Henrik Payne.








.

## 2018-08-09 NOTE — OPERATIVE REPORT
DATE OF OPERATION:  08/09/2018

 

PREOPERATIVE DIAGNOSIS:  L5-S1 herniated nucleus pulposus, history of an

L4-L5 decompressive laminectomy with a right S1 radiculopathy.

 

POSTOPERATIVE DIAGNOSIS:  Same.

 

PROCEDURE:  Caudal epidural steroid injection under fluoroscopic guidance.

 

INDICATIONS:  Patient is a 72-year-old white male who underwent a lumbar

decompression which required hematoma removal and did well for 2 months, now

has increasing pain radiating down the leg.  MRI reveals a disk herniation at

L5-S1 on the right side coming in close contact with the nerve roots in this

region.  Decision was made to do a caudal epidural injection to try to

provide him with relief of radicular pain down the right leg.

 

PHYSICAL EXAMINATION:

GENERAL:  Pleasant male, seated comfortably.

MUSCULOSKELETAL:  Lumbar paraspinal muscles are palpated.  Incision is well

healed.  He has no discomfort related to the incisional area.  He has

soreness in the right sciatic notch.  He has limitations with flexion and

extension, but they are not painful, more balance related.  No focal weakness

in lower extremities.  Negative seated straight leg raises.

 

CONSENT:  Verbal and written consent was obtained from the patient.  Risks

and benefits reviewed.  Risks include but are not limited to abscess and

allergic reaction.  Patient wishes to proceed.

 

DESCRIPTION OF PROCEDURE:  Patient was taken back to the special procedures

room of the [facility] where he was maintained in a prone

position.  Backside was cleansed with Betadine x3, and a dry sterile dressing

was applied.  Fluoroscope was used to identify the sacral hiatus, and

overlying skin was anesthetized with 4 mL of lidocaine 1% over a 25 gauge

1-1/2-inch needle.  A 25 gauge 3-1/2 inch spinal needle was then directed

down toward sacral canal and advanced through the sacral hiatus and into the

canal under lateral fluoroscopic guidance.  He then underwent injection after

negative aspiration of 4 mL of preservative free sodium chloride and 40 mg

Depo-Medrol.  Injection was well tolerated.

 

DISPOSITION:

1.  Patient is taken out to the discharge recovery area from where he will be

discharged home once his discharge criteria met.

2.  Follow up in the [facility] office in 4 weeks' time.

 

 

I attest to the content of the Intraoperative Record and any orders documented therein. Any exception
s are noted below.

## 2018-08-09 NOTE — [facility] POST OPERATIVE BRIEF NOTE
Immediate Operative Summary


Operative Date


Aug 9, 2018.





Pre-Operative Diagnosis





L5-S1 HNP.  RIGHT S1 RADICULOPATHY.





Post-Operative Diagnosis





L5-S1 HNP.  RIGHT S1 RADICULOPATHY.





Procedure(s) Performed





CAUDAL EPIDURAL STEROID INJECTION.





Surgeon


DR. ANGELITA GONZALEZ





Assistant Surgeon(s)


None





Estimated Blood Loss


0





Findings


Consistent with Post-Op Diagnosis





Specimens





NA





Anesthesia Type


Local





Complication(s)


none





Disposition


Disposition:

## 2018-08-13 ENCOUNTER — HOSPITAL ENCOUNTER (EMERGENCY)
Dept: HOSPITAL 45 - C.EDB | Age: 73
Discharge: HOME | End: 2018-08-13
Payer: COMMERCIAL

## 2018-08-13 VITALS
BODY MASS INDEX: 32.1 KG/M2 | HEIGHT: 69.02 IN | WEIGHT: 216.71 LBS | BODY MASS INDEX: 32.1 KG/M2 | WEIGHT: 216.71 LBS | HEIGHT: 69.02 IN

## 2018-08-13 VITALS — TEMPERATURE: 97.88 F

## 2018-08-13 VITALS — OXYGEN SATURATION: 94 % | SYSTOLIC BLOOD PRESSURE: 145 MMHG | DIASTOLIC BLOOD PRESSURE: 86 MMHG | HEART RATE: 56 BPM

## 2018-08-13 DIAGNOSIS — I10: ICD-10-CM

## 2018-08-13 DIAGNOSIS — Z86.73: ICD-10-CM

## 2018-08-13 DIAGNOSIS — Z79.82: ICD-10-CM

## 2018-08-13 DIAGNOSIS — Z79.02: ICD-10-CM

## 2018-08-13 DIAGNOSIS — Z79.4: ICD-10-CM

## 2018-08-13 DIAGNOSIS — Z79.899: ICD-10-CM

## 2018-08-13 DIAGNOSIS — E78.5: ICD-10-CM

## 2018-08-13 DIAGNOSIS — E11.9: ICD-10-CM

## 2018-08-13 DIAGNOSIS — M79.604: Primary | ICD-10-CM

## 2018-08-13 LAB
BASOPHILS # BLD: 0.01 K/UL (ref 0–0.2)
BASOPHILS NFR BLD: 0.2 %
BUN SERPL-MCNC: 16 MG/DL (ref 7–18)
CALCIUM SERPL-MCNC: 9.4 MG/DL (ref 8.5–10.1)
CO2 SERPL-SCNC: 22 MMOL/L (ref 21–32)
CREAT SERPL-MCNC: 1.13 MG/DL (ref 0.6–1.4)
EOS ABS #: 0.32 K/UL (ref 0–0.5)
EOSINOPHIL NFR BLD AUTO: 168 K/UL (ref 130–400)
GLUCOSE SERPL-MCNC: 191 MG/DL (ref 70–99)
HCT VFR BLD CALC: 38.6 % (ref 42–52)
HGB BLD-MCNC: 12.8 G/DL (ref 14–18)
IG#: 0.01 K/UL (ref 0–0.02)
IMM GRANULOCYTES NFR BLD AUTO: 31.2 %
INR PPP: 1 (ref 0.9–1.1)
LYMPHOCYTES # BLD: 1.51 K/UL (ref 1.2–3.4)
MCH RBC QN AUTO: 30.6 PG (ref 25–34)
MCHC RBC AUTO-ENTMCNC: 33.2 G/DL (ref 32–36)
MCV RBC AUTO: 92.3 FL (ref 80–100)
MONO ABS #: 0.49 K/UL (ref 0.11–0.59)
MONOCYTES NFR BLD: 10.1 %
NEUT ABS #: 2.5 K/UL (ref 1.4–6.5)
NEUTROPHILS # BLD AUTO: 6.6 %
NEUTROPHILS NFR BLD AUTO: 51.7 %
PMV BLD AUTO: 10.1 FL (ref 7.4–10.4)
POTASSIUM SERPL-SCNC: 4.3 MMOL/L (ref 3.5–5.1)
RED CELL DISTRIBUTION WIDTH CV: 13.8 % (ref 11.5–14.5)
RED CELL DISTRIBUTION WIDTH SD: 46.3 FL (ref 36.4–46.3)
SODIUM SERPL-SCNC: 142 MMOL/L (ref 136–145)
WBC # BLD AUTO: 4.84 K/UL (ref 4.8–10.8)

## 2018-08-13 NOTE — EMERGENCY ROOM VISIT NOTE
History


Report prepared by Amparo:  Lj Taylor


Under the Supervision of:  Dr. Jamel Montalvo D.O.


First contact with patient:  09:36


Chief Complaint:  LEG PAIN,LEG INJURY


Stated Complaint:  RT LEG SWELLING AND PAIN





History of Present Illness


The patient is a 72 year old male who presents to the Emergency Room with 

complaints of constant leg pain beginning May 4th and swelling in the right 

calf beginning 2 days ago. The patient reports that he had a laminectomy in 

February by Dr. Da Silva in [place]. He states that prior to his surgery, he 

was experiencing very similar pain to his current symptoms in the right calf. 

He notes that the pain went away after his surgery, but returned on May 4th. He 

also notes pain in the right hip joint. He states that he is on Plavix due to a 

history of stroke and baby aspirin. He reports that his pain is improved with 

lying down. Pt denies headache, change in vision, fevers, chest pain, shortness 

of breath, nausea, vomiting, diarrhea, pain with urination, and melena.





   Source of History:  patient


   Onset:  May 4th (pain), 2 days ago (swelling)


   Position:  leg (right calf)


   Timing:  constant


   Modifying Factors (Relieving):  other (lying down)


Note:


pain in the right hip joint





Review of Systems


See HPI for pertinent positives & negatives. A total of 10 systems reviewed and 

were otherwise negative.





Past Medical & Surgical


Medical Problems:


(1) CVA (cerebral vascular accident)


(2) Diabetes


(3) HTN (hypertension)


(4) Hyperlipemia


(5) TIA (transient ischemic attack)


(6) Tobacco abuse


Surgical Problems:


(1) H/O: knee surgery


(2) History of lumbar surgery








Family History





Patient reports no known family medical history.





Social History


Smoking Status:  Never Smoker


Alcohol Use:  none


Drug Use:  none


Marital Status:  


Housing Status:  lives with family


Occupation Status:  retired





Current/Historical Medications


Scheduled


Amlodipine Besylate (Amlodipine Besylate), 10 MG PO DAILY


Atorvastatin (Lipitor), 40 MG PO DAILY


Clopidogrel Bisulfate (Clopidogrel), 75 MG PO DAILY


Cyanocobalamin (Vitamin B-12), 1,000 MCG PO DAILY


Gabapentin (Gabapentin), 100 MG PO QAM


Gabapentin (Neurontin), 200 MG PO QPM


Insulin Glargine (Toujeo Solostar), 55 UNITS SC HS


Insulin Lispro (Human) (Humalog Kwikpen), 1 DOSE SC WITH MEALS


Losartan Potassium (Cozaar), 100 MG PO DAILY


Metformin Hcl (Glucophage), 500 MG PO BID


Multivitamin (Multivitamin), 1 TAB PO DAILY


Tramadol HCl (Tramadol HCl), 50 MG PO QAM





Scheduled PRN


Meclizine HCl (Meclizine HCl), 25 MG PO Q8 PRN for Dizziness or Vertigo





Allergies


Coded Allergies:  


     No Known Allergies (Verified , 8/9/18)





Physical Exam


Vital Signs











  Date Time  Temp Pulse Resp B/P (MAP) Pulse Ox O2 Delivery O2 Flow Rate FiO2


 


8/13/18 12:33  56 18 145/86 94 Room Air  


 


8/13/18 10:49  55 18 129/75 95 Room Air  


 


8/13/18 09:30 36.6 63 18 162/99 99 Room Air  











Physical Exam


GENERAL: Sitting up in bed, alert, well appearing, well nourished, no distress, 

non-toxic 


EYE EXAM: normal conjunctiva. 


OROPHARYNX: no exudate, no erythema, lips, buccal mucosa, and tongue normal and 

mucous membranes are moist


NECK: supple, no nuchal rigidity, no adenopathy, non-tender


LUNGS: Clear to auscultation. Normal chest wall mechanics


HEART: no murmurs, S1 normal and S2 normal 


ABDOMEN: abdomen soft, non-tender, normo-active bowel sounds, no masses, no 

rebound or guarding. 


SKIN: no rashes and no bruising 


UPPER EXTREMITIES: upper extremities are grossly normal. 


LOWER EXTREMITIES: The right calf is larger than the left, and there is minimal 

tenderness to the right lateral calf. Full ROM of the hips, knees, and ankles 

is appreciated. There is bruising over the 3rd right toe and on the right 

lateral foot. Distal pulses 2/4. 


NEURO EXAM: Normal sensorium, cranial nerves II-XII grossly intact, normal 

speech,  no gross weakness of arms, no gross weakness of legs. Gross sensation 

intact.





Medical Decision & Procedures


ER Provider


Diagnostic Interpretation:


Radiology results as stated below per my review and the radiologist's 

interpretation: 





R VENOUS DOPP LOWER EXT UNILAT





CLINICAL HISTORY: rle swelling  pain. Edema.





TECHNIQUE: Venous Doppler





COMPARISON STUDY:  None





FINDINGS: Normal study





IMPRESSION:  Normal study 














The above report was generated using voice recognition software.  It may contain


grammatical, syntax or spelling errors.











Electronically signed by:  Deric Muñoz M.D.


8/13/2018 12:09 PM





Dictated Date/Time:  8/13/2018 12:09 PM





Laboratory Results


8/13/18 09:55








Red Blood Count 4.18, Mean Corpuscular Volume 92.3, Mean Corpuscular Hemoglobin 

30.6, Mean Corpuscular Hemoglobin Concent 33.2, Mean Platelet Volume 10.1, 

Neutrophils (%) (Auto) 51.7, Lymphocytes (%) (Auto) 31.2, Monocytes (%) (Auto) 

10.1, Eosinophils (%) (Auto) 6.6, Basophils (%) (Auto) 0.2, Neutrophils # (Auto

) 2.50, Lymphocytes # (Auto) 1.51, Monocytes # (Auto) 0.49, Eosinophils # (Auto

) 0.32, Basophils # (Auto) 0.01





8/13/18 09:55

















Test


  8/13/18


09:55


 


White Blood Count


  4.84 K/uL


(4.8-10.8)


 


Red Blood Count


  4.18 M/uL


(4.7-6.1)


 


Hemoglobin


  12.8 g/dL


(14.0-18.0)


 


Hematocrit 38.6 % (42-52) 


 


Mean Corpuscular Volume


  92.3 fL


()


 


Mean Corpuscular Hemoglobin


  30.6 pg


(25-34)


 


Mean Corpuscular Hemoglobin


Concent 33.2 g/dl


(32-36)


 


Platelet Count


  168 K/uL


(130-400)


 


Mean Platelet Volume


  10.1 fL


(7.4-10.4)


 


Neutrophils (%) (Auto) 51.7 % 


 


Lymphocytes (%) (Auto) 31.2 % 


 


Monocytes (%) (Auto) 10.1 % 


 


Eosinophils (%) (Auto) 6.6 % 


 


Basophils (%) (Auto) 0.2 % 


 


Neutrophils # (Auto)


  2.50 K/uL


(1.4-6.5)


 


Lymphocytes # (Auto)


  1.51 K/uL


(1.2-3.4)


 


Monocytes # (Auto)


  0.49 K/uL


(0.11-0.59)


 


Eosinophils # (Auto)


  0.32 K/uL


(0-0.5)


 


Basophils # (Auto)


  0.01 K/uL


(0-0.2)


 


RDW Standard Deviation


  46.3 fL


(36.4-46.3)


 


RDW Coefficient of Variation


  13.8 %


(11.5-14.5)


 


Immature Granulocyte % (Auto) 0.2 % 


 


Immature Granulocyte # (Auto)


  0.01 K/uL


(0.00-0.02)


 


Prothrombin Time


  10.5 SECONDS


(9.0-12.0)


 


Prothromb Time International


Ratio 1.0 (0.9-1.1) 


 


 


Anion Gap


  7.0 mmol/L


(3-11)


 


Est Creatinine Clear Calc


Drug Dose 68.3 ml/min 


 


 


Estimated GFR (


American) 74.8 


 


 


Estimated GFR (Non-


American 64.6 


 


 


BUN/Creatinine Ratio 14.4 (10-20) 


 


Calcium Level


  9.4 mg/dl


(8.5-10.1)





Laboratory results per my review.





ED Course


ED COURSE: 


Vital signs were reviewed and showed hypertension that was felt to be 

situational.


The patients medical record was reviewed


The above diagnostic studies were performed and reviewed.


ED treatments and interventions as stated above. 





0940: The patient was evaluated in room A4B. A complete history and physical 

examination was performed.





1220: Upon reevaluation, the patient is resting. I discussed my findings with 

the him and he understands and agrees with the treatment plan.   


Based on the patients age, coexisting illnesses, exam and lab findings the 

decision to treat as an outpatient was made.


The patient remained stable while under my care.


The patient appeared well at the time of discharge.





Medical Decision





Differential diagnosis:


Etiologies such as DVT, musculoskeletal, infection, joint effusion, trauma, 

lymphedema, idiopathic, CHF, as well as others were entertained.





Patient is a 72-year-old male who presents to the ER for right calf pain which 

has been present since May.  Patient notes that it went away for 2 months after 

his back surgery but then returned and is the same pain.  Notes daughter has 

noticed right calf is larger than left.  On exam and is consistently larger.  

Duplex is negative.  CBC and BMP were unremarkable.  Nothing to suggest CHF.  

Patient was updated at bedside and discharge follow-up with PCP as an 

outpatient. Discussed with Pt concerning signs and symptoms to watch out for. 

Pt was instructed to follow up with their PCP and discussed with the patient 

their option to return to the ED at anytime for persistent or worsening 

symptoms. The appropriate anticipatory guidance and out-patient management, 

including indications for return to the emergency department, were explained at 

length to the patient and understood.





Medication Reconcilliation


Current Medication List:  was personally reviewed by me





Blood Pressure Screening


Patient's blood pressure:  Elevated blood pressure


Blood pressure disposition:  Elevated BP felt to be situational





Impression





 Primary Impression:  


 Leg pain, right





Scribe Attestation


The scribe's documentation has been prepared under my direction and personally 

reviewed by me in its entirety. I confirm that the note above accurately 

reflects all work, treatment, procedures, and medical decision making performed 

by me.





Departure Information


Dispostion


Home / Self-Care





Referrals


Vishnu Hastings M.D. (PCP)





Forms


HOME CARE DOCUMENTATION FORM,                                                 

               IMPORTANT VISIT INFORMATION





Patient Instructions


My [facility]





Additional Instructions





Please follow up with your primary care doctor with in the next 24 hours.  Any 

worsening of your symptoms, please return to the ED immediately. This includes 

any fevers greater than 100.4, worsening pain, chest pain, shortness breath, 

persistent nausea, vomiting, unable to eat or drink, weakness or numbness in 

her arms legs, or any other concerning signs or symptoms from your standpoint.





Please take Tylenol or Motrin as needed for pain.





Please make sure as stated above the follow-up with your PCP.

## 2018-08-13 NOTE — DIAGNOSTIC IMAGING REPORT
R VENOUS DOPP LOWER EXT UNILAT



CLINICAL HISTORY: rle swelling  pain. Edema.



TECHNIQUE: Venous Doppler



COMPARISON STUDY:  None



FINDINGS: Normal study



IMPRESSION:  Normal study 









The above report was generated using voice recognition software.  It may contain

grammatical, syntax or spelling errors.







Electronically signed by:  Deric Muñoz M.D.

8/13/2018 12:09 PM



Dictated Date/Time:  8/13/2018 12:09 PM

## 2018-08-20 ENCOUNTER — HOSPITAL ENCOUNTER (OUTPATIENT)
Dept: HOSPITAL 45 - C.RADBC | Age: 73
Discharge: HOME | End: 2018-08-20
Attending: PHYSICIAN ASSISTANT
Payer: COMMERCIAL

## 2018-08-20 DIAGNOSIS — M25.579: Primary | ICD-10-CM
